# Patient Record
Sex: FEMALE | Race: WHITE | NOT HISPANIC OR LATINO | Employment: OTHER | URBAN - METROPOLITAN AREA
[De-identification: names, ages, dates, MRNs, and addresses within clinical notes are randomized per-mention and may not be internally consistent; named-entity substitution may affect disease eponyms.]

---

## 2017-09-17 ENCOUNTER — OFFICE VISIT (OUTPATIENT)
Dept: URGENT CARE | Facility: CLINIC | Age: 77
End: 2017-09-17
Payer: MEDICARE

## 2017-09-17 VITALS
TEMPERATURE: 98 F | SYSTOLIC BLOOD PRESSURE: 140 MMHG | WEIGHT: 160 LBS | HEIGHT: 64 IN | DIASTOLIC BLOOD PRESSURE: 69 MMHG | BODY MASS INDEX: 27.31 KG/M2 | OXYGEN SATURATION: 96 % | HEART RATE: 86 BPM

## 2017-09-17 DIAGNOSIS — S52.501A TRAUMATIC CLOSED DISPLACED FRACTURE OF DISTAL END OF RADIUS, RIGHT, INITIAL ENCOUNTER: Primary | ICD-10-CM

## 2017-09-17 PROCEDURE — 1159F MED LIST DOCD IN RCRD: CPT | Mod: S$GLB,,, | Performed by: EMERGENCY MEDICINE

## 2017-09-17 PROCEDURE — 99203 OFFICE O/P NEW LOW 30 MIN: CPT | Mod: S$GLB,,, | Performed by: EMERGENCY MEDICINE

## 2017-09-17 PROCEDURE — 3008F BODY MASS INDEX DOCD: CPT | Mod: S$GLB,,, | Performed by: EMERGENCY MEDICINE

## 2017-09-17 RX ORDER — TRAMADOL HYDROCHLORIDE 50 MG/1
50 TABLET ORAL EVERY 6 HOURS PRN
Qty: 17 TABLET | Refills: 0 | Status: SHIPPED | OUTPATIENT
Start: 2017-09-17

## 2017-09-17 RX ORDER — NAPROXEN 500 MG/1
TABLET ORAL
COMMUNITY
Start: 2017-08-29

## 2017-09-17 RX ORDER — NAPROXEN 25 MG/ML
SUSPENSION ORAL 2 TIMES DAILY
COMMUNITY
End: 2017-09-18 | Stop reason: CLARIF

## 2017-09-17 RX ORDER — HYDROCHLOROTHIAZIDE 12.5 MG/1
12.5 CAPSULE ORAL DAILY
COMMUNITY

## 2017-09-17 RX ORDER — LISINOPRIL 10 MG/1
TABLET ORAL DAILY
COMMUNITY
Start: 2017-07-18

## 2017-09-17 RX ORDER — LORAZEPAM 0.5 MG/1
TABLET ORAL
COMMUNITY
Start: 2017-08-29

## 2017-09-17 NOTE — PROGRESS NOTES
"Subjective:       Patient ID: Adela Mcdonough is a 77 y.o. female.    Vitals:  height is 5' 4" (1.626 m) and weight is 72.6 kg (160 lb). Her temperature is 98.2 °F (36.8 °C). Her blood pressure is 140/69 (abnormal) and her pulse is 86. Her oxygen saturation is 96%.     Chief Complaint: Wrist Pain    Wrist Pain    The pain is present in the right wrist. This is a new problem. The current episode started today. The problem occurs constantly. The problem has been gradually worsening. The quality of the pain is described as aching. The pain is at a severity of 9/10. The pain is severe. Associated symptoms include joint swelling, a limited range of motion and stiffness. Pertinent negatives include no fever. The symptoms are aggravated by activity. She has tried nothing for the symptoms. The treatment provided no relief.     Review of Systems   Constitution: Negative for chills and fever.   HENT: Negative for sore throat.    Eyes: Negative for blurred vision.   Cardiovascular: Negative for chest pain.   Respiratory: Negative for shortness of breath.    Skin: Negative for rash.   Musculoskeletal: Positive for falls, joint pain, joint swelling and stiffness. Negative for back pain.        Rt. Wrist. Patient fell on rt. wrist   Gastrointestinal: Negative for abdominal pain, diarrhea, nausea and vomiting.   Neurological: Negative for headaches.   Psychiatric/Behavioral: The patient is not nervous/anxious.        Objective:      Physical Exam   Constitutional: She is oriented to person, place, and time. She appears well-developed and well-nourished. She is cooperative.  Non-toxic appearance. She does not appear ill. No distress.   HENT:   Head: Normocephalic and atraumatic. Head is without abrasion, without contusion and without laceration.   Right Ear: Hearing, tympanic membrane, external ear and ear canal normal. No hemotympanum.   Left Ear: Hearing, tympanic membrane, external ear and ear canal normal. No " hemotympanum.   Nose: Nose normal. No mucosal edema, rhinorrhea or nasal deformity. No epistaxis. Right sinus exhibits no maxillary sinus tenderness and no frontal sinus tenderness. Left sinus exhibits no maxillary sinus tenderness and no frontal sinus tenderness.   Mouth/Throat: Uvula is midline, oropharynx is clear and moist and mucous membranes are normal. No trismus in the jaw. Normal dentition. No uvula swelling. No posterior oropharyngeal erythema.   Eyes: Conjunctivae, EOM and lids are normal. Pupils are equal, round, and reactive to light. Right eye exhibits no discharge. Left eye exhibits no discharge. No scleral icterus.   Sclera clear bilat   Neck: Trachea normal, normal range of motion, full passive range of motion without pain and phonation normal. Neck supple. No spinous process tenderness and no muscular tenderness present. No neck rigidity. No tracheal deviation present.   Cardiovascular: Normal rate, regular rhythm, normal heart sounds, intact distal pulses and normal pulses.    Pulmonary/Chest: Effort normal and breath sounds normal. No respiratory distress.   Abdominal: Soft. Normal appearance and bowel sounds are normal. She exhibits no distension, no pulsatile midline mass and no mass. There is no tenderness.   Musculoskeletal: She exhibits no edema or deformity.        Arms:  Neurological: She is alert and oriented to person, place, and time. She has normal strength. No cranial nerve deficit or sensory deficit. She exhibits normal muscle tone. She displays no seizure activity. Coordination normal. GCS eye subscore is 4. GCS verbal subscore is 5. GCS motor subscore is 6.   Skin: Skin is warm, dry and intact. Capillary refill takes less than 2 seconds. No abrasion, no bruising, no burn, no ecchymosis and no laceration noted. She is not diaphoretic. No pallor.   Psychiatric: She has a normal mood and affect. Her speech is normal and behavior is normal. Judgment and thought content normal.  Cognition and memory are normal.   Nursing note and vitals reviewed.    PROCEDURE NOTE:  VERBAL CONSENT OBTAINED  SUGAR TONG SPLINT APPLIED TO THE RUE BY RT AND SECURED IN PLACE WITH ELASTIC BANDAGES.  PT TOLERATED WELL WITH NO COMPLICATIONS.  Assessment:       1. Traumatic closed displaced fracture of distal end of radius, right, initial encounter        Plan:         Traumatic closed displaced fracture of distal end of radius, right, initial encounter  -     X-Ray Wrist Complete Right; Future; Expected date: 09/17/2017  -     tramadol (ULTRAM) 50 mg tablet; Take 1 tablet (50 mg total) by mouth every 6 (six) hours as needed for Pain.  Dispense: 17 tablet; Refill: 0  -     Ambulatory referral to Orthopedics

## 2017-09-17 NOTE — PATIENT INSTRUCTIONS
Please drink plenty of fluids.  Please get plenty of rest.  Please return here or go to the Emergency Department for any concerns or worsening of condition.  If you were prescribed a narcotic medication, do not drive or operate heavy equipment or machinery while taking these medications.  If you were not prescribed an anti-inflammatory medication, and if you do not have any history of stomach/intestinal ulcers, or kidney disease, or are not taking a blood thinner such as Coumadin, Plavix, Pradaxa, Eloquis, or Xaralta for example, it is OK to take over the counter Ibuprofen or Advil or Motrin or Aleve as directed.  Do not take these medications on an empty stomach.  Rest, ice, compression and elevation to the affected joint or limb as needed.  Please follow up with your primary care doctor or specialist as needed.    If you  smoke, please stop smoking.    PLEASE CALL Dr. BLANK OFFICE IN THE MORNING FOR AN APPOINTMENT      Understanding a Distal Radius Fracture      A fracture is a broken bone. A fracture in the distal radius is a break in the lower end of the radius. This is the larger bone in the forearm. Because the break occurs near the wrist, it is often called a wrist fracture.    The bone may be cracked, or it may be broken into 2 or more pieces. The pieces of bone may be lined up or they may have moved out of place. Sometimes, the bone may break through the skin. Nearby nerves, tissues, and joints also may be damaged. Depending on the severity of the fracture, healing may take several months or longer.  What causes a distal radius fracture?  This type of fracture is most often caused from a fall on an outstretched hand. It can also be caused from a blow, accident, or sports injury.  Symptoms of a distal radius fracture  Symptoms can include pain, swelling, and bruising. If the bone breaks through the skin, external bleeding can also occur. The wrist may look crooked, deformed, or bent. It may be hard to  move or use the arm, wrist, and hand for normal tasks and activities.  Treating a distal radius fracture  Treatment depends on how serious the fracture is. If needed, the bone is put back into place. This may be done with or without surgery. If surgery is needed, the surgeon may use devices such as pins, plates, or screws to hold the bone together. You may need to wear a splint or cast for a month or longer to protect the bone and keep it in place during healing. Other treatments may be also used to help reduce symptoms or regain function. These include:  · Cold packs. Putting an ice pack on the injured area may help reduce swelling and pain.  · Raising the arm and wrist. Keeping the arm and wrist raised above heart level may help reduce swelling.  · Pain medicines. Taking prescription or over-the-counter pain medicines may help reduce pain and swelling.  · Exercises. Doing certain exercises at home or with a physical therapist can help restore strength, flexibility, and range of motion in your arm, wrist, and hand. In general, exercises are not started until after the splint or cast is removed.  Possible complications of a distal radius fracture  These can include:  · Poor healing of the bone  · Weakness, stiffness, or loss of range of motion in the arm, wrist, or hand  · Osteoarthritis in the wrist joint  When to call your healthcare provider  Call your healthcare provider right away if you have any of these:  · Fever of 100.4°F (38°C) or higher, or as directed  · Symptoms that dont get better with treatment, or get worse  · Numbness, coldness, or swelling in your arm, hand, or fingers  · Fingernails that turn blue or gray in color  · A splint or cast that is damaged or feels too tight or loose  · New symptoms   Date Last Reviewed: 3/10/2016  © 7711-8126 [employer]. [address] [place] [address], [place] [address]. All rights reserved. This information is not intended as a substitute for professional  medical care. Always follow your healthcare professional's instructions.

## 2017-09-19 ENCOUNTER — SURGERY (OUTPATIENT)
Age: 77
End: 2017-09-19

## 2017-09-19 ENCOUNTER — HOSPITAL ENCOUNTER (OUTPATIENT)
Facility: OTHER | Age: 77
Discharge: HOME | End: 2017-09-19
Attending: ORTHOPAEDIC SURGERY | Admitting: ORTHOPAEDIC SURGERY
Payer: MEDICARE

## 2017-09-19 ENCOUNTER — ANESTHESIA EVENT (OUTPATIENT)
Dept: SURGERY | Facility: OTHER | Age: 77
End: 2017-09-19
Payer: MEDICARE

## 2017-09-19 ENCOUNTER — ANESTHESIA (OUTPATIENT)
Dept: SURGERY | Facility: OTHER | Age: 77
End: 2017-09-19
Payer: MEDICARE

## 2017-09-19 VITALS
SYSTOLIC BLOOD PRESSURE: 168 MMHG | HEIGHT: 64 IN | OXYGEN SATURATION: 94 % | BODY MASS INDEX: 27.31 KG/M2 | RESPIRATION RATE: 16 BRPM | HEART RATE: 74 BPM | TEMPERATURE: 98 F | WEIGHT: 160 LBS | DIASTOLIC BLOOD PRESSURE: 84 MMHG

## 2017-09-19 DIAGNOSIS — S52.501A CLOSED FRACTURE OF DISTAL END OF RIGHT RADIUS, UNSPECIFIED FRACTURE MORPHOLOGY, INITIAL ENCOUNTER: Primary | ICD-10-CM

## 2017-09-19 DIAGNOSIS — S52.501A CLOSED FRACTURE OF RIGHT DISTAL RADIUS: ICD-10-CM

## 2017-09-19 PROCEDURE — 36000708 HC OR TIME LEV III 1ST 15 MIN: Performed by: ORTHOPAEDIC SURGERY

## 2017-09-19 PROCEDURE — 25000003 PHARM REV CODE 250: Performed by: ORTHOPAEDIC SURGERY

## 2017-09-19 PROCEDURE — 71000039 HC RECOVERY, EACH ADD'L HOUR: Performed by: ORTHOPAEDIC SURGERY

## 2017-09-19 PROCEDURE — 71000033 HC RECOVERY, INTIAL HOUR: Performed by: ORTHOPAEDIC SURGERY

## 2017-09-19 PROCEDURE — 94640 AIRWAY INHALATION TREATMENT: CPT

## 2017-09-19 PROCEDURE — 63600175 PHARM REV CODE 636 W HCPCS: Performed by: NURSE ANESTHETIST, CERTIFIED REGISTERED

## 2017-09-19 PROCEDURE — 37000008 HC ANESTHESIA 1ST 15 MINUTES: Performed by: ORTHOPAEDIC SURGERY

## 2017-09-19 PROCEDURE — 71000015 HC POSTOP RECOV 1ST HR: Performed by: ORTHOPAEDIC SURGERY

## 2017-09-19 PROCEDURE — 63600175 PHARM REV CODE 636 W HCPCS: Performed by: ANESTHESIOLOGY

## 2017-09-19 PROCEDURE — S0077 INJECTION, CLINDAMYCIN PHOSP: HCPCS | Performed by: ORTHOPAEDIC SURGERY

## 2017-09-19 PROCEDURE — C1713 ANCHOR/SCREW BN/BN,TIS/BN: HCPCS | Performed by: ORTHOPAEDIC SURGERY

## 2017-09-19 PROCEDURE — 25000003 PHARM REV CODE 250: Performed by: NURSE ANESTHETIST, CERTIFIED REGISTERED

## 2017-09-19 PROCEDURE — 25000003 PHARM REV CODE 250: Performed by: ANESTHESIOLOGY

## 2017-09-19 PROCEDURE — 36000709 HC OR TIME LEV III EA ADD 15 MIN: Performed by: ORTHOPAEDIC SURGERY

## 2017-09-19 PROCEDURE — 25000242 PHARM REV CODE 250 ALT 637 W/ HCPCS: Performed by: ANESTHESIOLOGY

## 2017-09-19 PROCEDURE — 27201423 OPTIME MED/SURG SUP & DEVICES STERILE SUPPLY: Performed by: ORTHOPAEDIC SURGERY

## 2017-09-19 PROCEDURE — 37000009 HC ANESTHESIA EA ADD 15 MINS: Performed by: ORTHOPAEDIC SURGERY

## 2017-09-19 DEVICE — PLATE DISTAL VOLAR RT SHORT: Type: IMPLANTABLE DEVICE | Site: WRIST | Status: FUNCTIONAL

## 2017-09-19 DEVICE — PEG THREADED 2.5MM 22MM LONG: Type: IMPLANTABLE DEVICE | Site: WRIST | Status: FUNCTIONAL

## 2017-09-19 DEVICE — SCREW CORTICAL 3.5 X 12: Type: IMPLANTABLE DEVICE | Site: ARM | Status: FUNCTIONAL

## 2017-09-19 DEVICE — PEG FULLY THRD 2.5X18: Type: IMPLANTABLE DEVICE | Site: WRIST | Status: FUNCTIONAL

## 2017-09-19 DEVICE — PEG SUPPORT SUBCHONDRAL 2.0X24: Type: IMPLANTABLE DEVICE | Site: WRIST | Status: FUNCTIONAL

## 2017-09-19 DEVICE — SCREW CORTICAL 3.5 X 10: Type: IMPLANTABLE DEVICE | Site: WRIST | Status: FUNCTIONAL

## 2017-09-19 DEVICE — PEG THREADED 2.5 X 20: Type: IMPLANTABLE DEVICE | Site: ARM | Status: FUNCTIONAL

## 2017-09-19 DEVICE — PEG SUPPORT SUBCHONDRAL 2.0X22: Type: IMPLANTABLE DEVICE | Site: WRIST | Status: FUNCTIONAL

## 2017-09-19 RX ORDER — PROPOFOL 10 MG/ML
VIAL (ML) INTRAVENOUS
Status: DISCONTINUED | OUTPATIENT
Start: 2017-09-19 | End: 2017-09-19

## 2017-09-19 RX ORDER — SODIUM CHLORIDE 0.9 % (FLUSH) 0.9 %
3 SYRINGE (ML) INJECTION
Status: DISCONTINUED | OUTPATIENT
Start: 2017-09-19 | End: 2017-09-19 | Stop reason: HOSPADM

## 2017-09-19 RX ORDER — FENTANYL CITRATE 50 UG/ML
INJECTION, SOLUTION INTRAMUSCULAR; INTRAVENOUS
Status: DISCONTINUED | OUTPATIENT
Start: 2017-09-19 | End: 2017-09-19

## 2017-09-19 RX ORDER — GLYCOPYRROLATE 0.2 MG/ML
INJECTION INTRAMUSCULAR; INTRAVENOUS
Status: DISCONTINUED | OUTPATIENT
Start: 2017-09-19 | End: 2017-09-19

## 2017-09-19 RX ORDER — HYDROCODONE BITARTRATE AND ACETAMINOPHEN 5; 325 MG/1; MG/1
1 TABLET ORAL EVERY 4 HOURS PRN
Qty: 20 TABLET | Refills: 0 | Status: SHIPPED | OUTPATIENT
Start: 2017-09-19

## 2017-09-19 RX ORDER — SODIUM CHLORIDE, SODIUM LACTATE, POTASSIUM CHLORIDE, CALCIUM CHLORIDE 600; 310; 30; 20 MG/100ML; MG/100ML; MG/100ML; MG/100ML
INJECTION, SOLUTION INTRAVENOUS CONTINUOUS PRN
Status: DISCONTINUED | OUTPATIENT
Start: 2017-09-19 | End: 2017-09-19

## 2017-09-19 RX ORDER — OXYCODONE HYDROCHLORIDE 5 MG/1
5 TABLET ORAL
Status: DISCONTINUED | OUTPATIENT
Start: 2017-09-19 | End: 2017-09-19 | Stop reason: HOSPADM

## 2017-09-19 RX ORDER — FENTANYL CITRATE 50 UG/ML
25 INJECTION, SOLUTION INTRAMUSCULAR; INTRAVENOUS EVERY 5 MIN PRN
Status: DISCONTINUED | OUTPATIENT
Start: 2017-09-19 | End: 2017-09-19 | Stop reason: HOSPADM

## 2017-09-19 RX ORDER — ALBUTEROL SULFATE 0.83 MG/ML
2.5 SOLUTION RESPIRATORY (INHALATION) EVERY 4 HOURS PRN
Status: DISCONTINUED | OUTPATIENT
Start: 2017-09-19 | End: 2017-09-19 | Stop reason: HOSPADM

## 2017-09-19 RX ORDER — ACETAMINOPHEN 10 MG/ML
INJECTION, SOLUTION INTRAVENOUS
Status: DISCONTINUED | OUTPATIENT
Start: 2017-09-19 | End: 2017-09-19

## 2017-09-19 RX ORDER — MEPERIDINE HYDROCHLORIDE 50 MG/ML
12.5 INJECTION INTRAMUSCULAR; INTRAVENOUS; SUBCUTANEOUS ONCE AS NEEDED
Status: DISCONTINUED | OUTPATIENT
Start: 2017-09-19 | End: 2017-09-19 | Stop reason: HOSPADM

## 2017-09-19 RX ORDER — ONDANSETRON 2 MG/ML
INJECTION INTRAMUSCULAR; INTRAVENOUS
Status: DISCONTINUED | OUTPATIENT
Start: 2017-09-19 | End: 2017-09-19

## 2017-09-19 RX ORDER — HYDROCODONE BITARTRATE AND ACETAMINOPHEN 5; 325 MG/1; MG/1
1 TABLET ORAL EVERY 4 HOURS PRN
Status: DISCONTINUED | OUTPATIENT
Start: 2017-09-19 | End: 2017-09-19 | Stop reason: HOSPADM

## 2017-09-19 RX ORDER — LIDOCAINE HCL/PF 100 MG/5ML
SYRINGE (ML) INTRAVENOUS
Status: DISCONTINUED | OUTPATIENT
Start: 2017-09-19 | End: 2017-09-19

## 2017-09-19 RX ORDER — OXYCODONE HYDROCHLORIDE 5 MG/1
5 TABLET ORAL
Status: DISCONTINUED | OUTPATIENT
Start: 2017-09-19 | End: 2017-09-19

## 2017-09-19 RX ORDER — PHENYLEPHRINE HYDROCHLORIDE 10 MG/ML
INJECTION INTRAVENOUS
Status: DISCONTINUED | OUTPATIENT
Start: 2017-09-19 | End: 2017-09-19

## 2017-09-19 RX ORDER — DEXAMETHASONE SODIUM PHOSPHATE 4 MG/ML
INJECTION, SOLUTION INTRA-ARTICULAR; INTRALESIONAL; INTRAMUSCULAR; INTRAVENOUS; SOFT TISSUE
Status: DISCONTINUED | OUTPATIENT
Start: 2017-09-19 | End: 2017-09-19

## 2017-09-19 RX ORDER — BUPIVACAINE HYDROCHLORIDE 2.5 MG/ML
INJECTION, SOLUTION EPIDURAL; INFILTRATION; INTRACAUDAL
Status: DISCONTINUED | OUTPATIENT
Start: 2017-09-19 | End: 2017-09-19 | Stop reason: HOSPADM

## 2017-09-19 RX ORDER — CLINDAMYCIN PHOSPHATE 900 MG/50ML
900 INJECTION, SOLUTION INTRAVENOUS
Status: COMPLETED | OUTPATIENT
Start: 2017-09-19 | End: 2017-09-19

## 2017-09-19 RX ORDER — HYDROMORPHONE HYDROCHLORIDE 2 MG/ML
0.4 INJECTION, SOLUTION INTRAMUSCULAR; INTRAVENOUS; SUBCUTANEOUS EVERY 5 MIN PRN
Status: DISCONTINUED | OUTPATIENT
Start: 2017-09-19 | End: 2017-09-19 | Stop reason: HOSPADM

## 2017-09-19 RX ADMIN — ACETAMINOPHEN 1000 MG: 10 INJECTION, SOLUTION INTRAVENOUS at 12:09

## 2017-09-19 RX ADMIN — ONDANSETRON 4 MG: 2 INJECTION INTRAMUSCULAR; INTRAVENOUS at 12:09

## 2017-09-19 RX ADMIN — FENTANYL CITRATE 25 MCG: 50 INJECTION, SOLUTION INTRAMUSCULAR; INTRAVENOUS at 12:09

## 2017-09-19 RX ADMIN — FENTANYL CITRATE 25 MCG: 50 INJECTION, SOLUTION INTRAMUSCULAR; INTRAVENOUS at 01:09

## 2017-09-19 RX ADMIN — CLINDAMYCIN PHOSPHATE 900 MG: 18 INJECTION, SOLUTION INTRAVENOUS at 12:09

## 2017-09-19 RX ADMIN — FENTANYL CITRATE 50 MCG: 50 INJECTION, SOLUTION INTRAMUSCULAR; INTRAVENOUS at 01:09

## 2017-09-19 RX ADMIN — OXYCODONE HYDROCHLORIDE 5 MG: 5 TABLET ORAL at 01:09

## 2017-09-19 RX ADMIN — PROPOFOL 200 MG: 10 INJECTION, EMULSION INTRAVENOUS at 12:09

## 2017-09-19 RX ADMIN — PHENYLEPHRINE HYDROCHLORIDE 100 MCG: 10 INJECTION INTRAVENOUS at 12:09

## 2017-09-19 RX ADMIN — LIDOCAINE HYDROCHLORIDE 100 MG: 20 INJECTION, SOLUTION INTRAVENOUS at 12:09

## 2017-09-19 RX ADMIN — BUPIVACAINE HYDROCHLORIDE 10 ML: 2.5 INJECTION, SOLUTION EPIDURAL; INFILTRATION; INTRACAUDAL; PERINEURAL at 12:09

## 2017-09-19 RX ADMIN — ALBUTEROL SULFATE 2.5 MG: 2.5 SOLUTION RESPIRATORY (INHALATION) at 10:09

## 2017-09-19 RX ADMIN — FENTANYL CITRATE 50 MCG: 50 INJECTION, SOLUTION INTRAMUSCULAR; INTRAVENOUS at 12:09

## 2017-09-19 RX ADMIN — SODIUM CHLORIDE, SODIUM LACTATE, POTASSIUM CHLORIDE, AND CALCIUM CHLORIDE: 600; 310; 30; 20 INJECTION, SOLUTION INTRAVENOUS at 11:09

## 2017-09-19 RX ADMIN — FENTANYL CITRATE 25 MCG: 50 INJECTION INTRAMUSCULAR; INTRAVENOUS at 01:09

## 2017-09-19 RX ADMIN — GLYCOPYRROLATE 0.2 MG: 0.2 INJECTION, SOLUTION INTRAMUSCULAR; INTRAVENOUS at 12:09

## 2017-09-19 RX ADMIN — DEXAMETHASONE SODIUM PHOSPHATE 8 MG: 4 INJECTION, SOLUTION INTRAMUSCULAR; INTRAVENOUS at 12:09

## 2017-09-19 NOTE — OP NOTE
DATE OF PROCEDURE: 9/19/17  PREOPERATIVE DIAGNOSIS: Distal radius fracture, right  wrist.   POSTOPERATIVE DIAGNOSIS: Distal radius fracture,  wrist.   PROCEDURE PERFORMED: Open reduction and internal fixation of right distal   radius fracture.   INDICATIONS: This patient is a 76 yo woman who sustained an injury the right   wrist. Radiographs have demonstrated a fracture of the distal radius with some   displacement. After the pros, cons, risks and benefits of operative   intervention were reviewed, the patient has elected to undergo the above procedure.     PROCEDURE IN DETAIL: The patient was brought to the Operating Suite, placed   supine on the operating table. General endotracheal anesthesia was administered   per the anesthesiologist without difficulty. The right upper extremity was   thoroughly prepped with alcohol and Betadine and draped in the usual sterile   fashion. After Esmarch exsanguination, the tourniquet was elevated to 250 mmHg.   A longitudinal incision was then made over the volar aspect of the right wrist   and careful dissection was carried down through the subdermal tissue using   bipolar cautery for hemostasis. The flexor carpi radialis tendon was identified   and it was retracted toward the ulnar aspect of the wrist. The pronator   quadratus was taken down from the distal radius and the fracture site was   identified, cleaned of soft tissue and periosteum and was then reduced   anatomically. Definitive fixation was then carried out using a DVR distal   radius locking plate, which was placed in appropriate position and secured to   the shaft with a bicortical screw of appropriate length. The fracture was then   reduced. Using the image intensifier, it was confirmed to be in appropriate   position and the distal peg holes were filled with a combination of smooth and   partially threaded locking pegs of appropriate length. The remainder of the   proximal holes was then filled with bicortical  screws and the tourniquet was   deflated. Hemostasis was confirmed. The image intensifier confirmed again in   the AP and lateral planes appropriate position of the fracture and hardware.   The wound was then closed in layers with Vicryl and nylon suture. A sterile   soft dressing and volar splint was then applied. The patient was awakened in   the Operating Suite and taken to the recovery area in stable condition. She   tolerated the procedure very well. Lap, instrument and needle counts were   correct at the end of the procedure x2.   BLOOD LOSS: Minimal.   COMPLICATIONS: None.     Claude S. Williams, MD

## 2017-09-19 NOTE — OR NURSING
Called Dr. Reyes and requested resp. tx for productive cough with exp. Wheeze.Albuterol tx ordered

## 2017-09-19 NOTE — INTERVAL H&P NOTE
The patient has been examined and the H&P has been reviewed:    I concur with the findings and no changes have occurred since H&P was written.    Anesthesia/Surgery risks, benefits and alternative options discussed and understood by patient/family.          Active Hospital Problems    Diagnosis  POA    Closed fracture of right distal radius [S52.308K]  Yes      Resolved Hospital Problems    Diagnosis Date Resolved POA   No resolved problems to display.

## 2017-09-19 NOTE — TRANSFER OF CARE
"Anesthesia Transfer of Care Note    Patient: Adela Mcdonough    Procedure(s) Performed: Procedure(s) (LRB):  OPEN REDUCTION INTERNAL FIXATION-WRIST (Right)    Patient location: PACU    Anesthesia Type: general    Transport from OR: Transported from OR on 2-3 L/min O2 by NC with adequate spontaneous ventilation    Post pain: adequate analgesia    Post assessment: no apparent anesthetic complications and tolerated procedure well    Post vital signs: stable    Level of consciousness: awake, alert and oriented    Nausea/Vomiting: no nausea/vomiting    Complications: none    Transfer of care protocol was followed      Last vitals:   Visit Vitals  BP (!) 177/74 (BP Location: Left arm, Patient Position: Lying)   Pulse 71   Temp 36.4 °C (97.6 °F) (Oral)   Resp 16   Ht 5' 4" (1.626 m)   Wt 72.6 kg (160 lb)   SpO2 97%   Breastfeeding? No   BMI 27.46 kg/m²     "

## 2017-09-19 NOTE — BRIEF OP NOTE
[facility]  Brief Operative Note     SUMMARY     Surgery Date: 9/19/2017     Surgeon(s) and Role:     * Claude S. Williams IV, MD - Primary    Assisting Surgeon: None    Pre-op Diagnosis:  Closed fracture of distal end of right radius, unspecified fracture morphology, initial encounter [S52.501A]    Post-op Diagnosis:  Post-Op Diagnosis Codes:     * Closed fracture of distal end of right radius, unspecified fracture morphology, initial encounter [S52.501A]    Procedure(s) (LRB):  OPEN REDUCTION INTERNAL FIXATION-WRIST (Right)    Anesthesia: General    Description of the findings of the procedure: as above    Findings/Key Components: as above    Estimated Blood Loss: * No values recorded between 9/19/2017 12:34 PM and 9/19/2017  1:04 PM *         Specimens:   Specimen (12h ago through future)    None          Discharge Note    SUMMARY     Admit Date: 9/19/2017    Discharge Date and Time:  09/19/2017 1:04 PM    Hospital Course (synopsis of major diagnoses, care, treatment, and services provided during the course of the hospital stay): uneventful     Final Diagnosis: Post-Op Diagnosis Codes:     * Closed fracture of distal end of right radius, unspecified fracture morphology, initial encounter [S52.501A]    Disposition: Home or Self Care    Follow Up/Patient Instructions:     Medications:  Reconciled Home Medications:   Current Discharge Medication List      START taking these medications    Details   hydrocodone-acetaminophen 5-325mg (NORCO) 5-325 mg per tablet Take 1 tablet by mouth every 4 (four) hours as needed for Pain.  Qty: 20 tablet, Refills: 0         CONTINUE these medications which have NOT CHANGED    Details   calcium citrate-vitamin D2 1,500-200 mg-unit Tab       hydrochlorothiazide (MICROZIDE) 12.5 mg capsule Take 12.5 mg by mouth once daily.      lisinopril 10 MG tablet once daily.       lorazepam (ATIVAN) 0.5 MG tablet       multivitamin-Ca-iron-minerals 27-0.4 mg Tab       naproxen  (NAPROSYN) 500 MG tablet       tramadol (ULTRAM) 50 mg tablet Take 1 tablet (50 mg total) by mouth every 6 (six) hours as needed for Pain.  Qty: 17 tablet, Refills: 0    Associated Diagnoses: Traumatic closed displaced fracture of distal end of radius, right, initial encounter             Discharge Procedure Orders  Diet general     Call MD for:  temperature >100.4     Call MD for:  persistent nausea and vomiting     Call MD for:  severe uncontrolled pain     Call MD for:  difficulty breathing, headache or visual disturbances     Call MD for:  redness, tenderness, or signs of infection (pain, swelling, redness, odor or green/yellow discharge around incision site)     Call MD for:  hives     Call MD for:  persistent dizziness or light-headedness     Call MD for:  extreme fatigue     Leave dressing on - Keep it clean, dry, and intact until clinic visit     Keep surgical extremity elevated     Lifting restrictions     No driving, operating heavy equipment or signing legal documents while taking pain medication       Follow-up Information     Claude S. Williams Iv, MD.    Specialty:  Orthopedic Surgery  Why:  For wound re-check  Contact information:  [address]  [facility] [address]  [phone]

## 2017-09-19 NOTE — ANESTHESIA POSTPROCEDURE EVALUATION
"Anesthesia Post Evaluation    Patient: Adela Mcdonough    Procedure(s) Performed: Procedure(s) (LRB):  OPEN REDUCTION INTERNAL FIXATION-WRIST (Right)    Final Anesthesia Type: general  Patient location during evaluation: PACU  Patient participation: Yes- Able to Participate  Level of consciousness: awake and alert  Post-procedure vital signs: reviewed and stable  Pain management: adequate  Airway patency: patent  PONV status at discharge: No PONV  Anesthetic complications: no      Cardiovascular status: blood pressure returned to baseline  Respiratory status: unassisted, spontaneous ventilation and room air  Hydration status: euvolemic  Follow-up not needed.        Visit Vitals  BP (!) 160/67   Pulse 70   Temp 36.7 °C (98.1 °F) (Oral)   Resp 16   Ht 5' 4" (1.626 m)   Wt 72.6 kg (160 lb)   SpO2 (!) 94%   Breastfeeding? No   BMI 27.46 kg/m²       Pain/Naomi Score: Pain Assessment Performed: Yes (9/19/2017  2:10 PM)  Presence of Pain: complains of pain/discomfort (9/19/2017  2:10 PM)  Pain Rating Prior to Med Admin: 6 (9/19/2017  1:50 PM)  Pain Rating Post Med Admin: 4 (9/19/2017  2:00 PM)  Naomi Score: 10 (9/19/2017  2:10 PM)  Modified Naomi Score: 17 (9/19/2017  1:14 PM)      "

## 2017-09-19 NOTE — ANESTHESIA PREPROCEDURE EVALUATION
09/19/2017  Adela Mcdonough is a 77 y.o., female.    Anesthesia Evaluation    I have reviewed the Patient Summary Reports.    I have reviewed the Nursing Notes.   I have reviewed the Medications.     Review of Systems  Anesthesia Hx:  No problems with previous Anesthesia  Denies Family Hx of Anesthesia complications.   Denies Personal Hx of Anesthesia complications.   Social:  Smoker 1 ppd   Hematology/Oncology:  Hematology Normal   Oncology Normal     EENT/Dental:EENT/Dental Normal   Cardiovascular:   Exercise tolerance: good Hypertension, well controlled    Pulmonary:  Pulmonary Normal    Renal/:  Renal/ Normal     Musculoskeletal:  Musculoskeletal Normal    Neurological:  Neurology Normal    Endocrine:  Endocrine Normal    Dermatological:  Skin Normal    Psych:  Psychiatric Normal           Physical Exam  General:  Well nourished    Airway/Jaw/Neck:  Airway Findings: Mouth Opening: Normal Tongue: Normal  General Airway Assessment: Adult  Mallampati: I  TM Distance: Normal, at least 6 cm  Jaw/Neck Findings:  Neck ROM: Normal ROM      Dental:  Dental Findings: In tact             Anesthesia Plan  Type of Anesthesia, risks & benefits discussed:  Anesthesia Type:  general  Patient's Preference:   Intra-op Monitoring Plan:   Intra-op Monitoring Plan Comments:   Post Op Pain Control Plan:   Post Op Pain Control Plan Comments:   Induction:   IV  Beta Blocker:         Informed Consent: Patient understands risks and agrees with Anesthesia plan.  Questions answered. Anesthesia consent signed with patient.  ASA Score: 2     Day of Surgery Review of History & Physical:    H&P update referred to the surgeon.         Ready For Surgery From Anesthesia Perspective.

## 2017-09-19 NOTE — PLAN OF CARE
Adela Elsie Mcdonough has met all discharge criteria from Phase II. Vital Signs are stable, ambulating  without difficulty. Discharge instructions given, patient verbalized understanding. Discharged from facility via wheelchair in stable condition.

## 2017-09-19 NOTE — DISCHARGE INSTRUCTIONS

## 2018-09-03 ENCOUNTER — OFFICE VISIT (OUTPATIENT)
Dept: URGENT CARE | Facility: CLINIC | Age: 78
End: 2018-09-03
Payer: MEDICARE

## 2018-09-03 VITALS
TEMPERATURE: 98 F | BODY MASS INDEX: 28.68 KG/M2 | WEIGHT: 168 LBS | HEIGHT: 64 IN | OXYGEN SATURATION: 96 % | HEART RATE: 79 BPM | DIASTOLIC BLOOD PRESSURE: 79 MMHG | SYSTOLIC BLOOD PRESSURE: 180 MMHG

## 2018-09-03 DIAGNOSIS — J20.9 ACUTE PURULENT BRONCHITIS: ICD-10-CM

## 2018-09-03 DIAGNOSIS — J98.01 ACUTE BRONCHOSPASM: Primary | ICD-10-CM

## 2018-09-03 DIAGNOSIS — R05.9 COUGH: ICD-10-CM

## 2018-09-03 PROCEDURE — 99214 OFFICE O/P EST MOD 30 MIN: CPT | Mod: 25,S$GLB,, | Performed by: NURSE PRACTITIONER

## 2018-09-03 PROCEDURE — 94640 AIRWAY INHALATION TREATMENT: CPT | Mod: 59,S$GLB,, | Performed by: NURSE PRACTITIONER

## 2018-09-03 PROCEDURE — 96372 THER/PROPH/DIAG INJ SC/IM: CPT | Mod: S$GLB,,, | Performed by: NURSE PRACTITIONER

## 2018-09-03 RX ORDER — IPRATROPIUM BROMIDE 0.5 MG/2.5ML
0.5 SOLUTION RESPIRATORY (INHALATION)
Status: COMPLETED | OUTPATIENT
Start: 2018-09-03 | End: 2018-09-03

## 2018-09-03 RX ORDER — POTASSIUM CHLORIDE 750 MG/1
10 CAPSULE, EXTENDED RELEASE ORAL ONCE
COMMUNITY

## 2018-09-03 RX ORDER — BETAMETHASONE SODIUM PHOSPHATE AND BETAMETHASONE ACETATE 3; 3 MG/ML; MG/ML
6 INJECTION, SUSPENSION INTRA-ARTICULAR; INTRALESIONAL; INTRAMUSCULAR; SOFT TISSUE
Status: COMPLETED | OUTPATIENT
Start: 2018-09-03 | End: 2018-09-03

## 2018-09-03 RX ORDER — ALBUTEROL SULFATE 90 UG/1
2 AEROSOL, METERED RESPIRATORY (INHALATION) EVERY 6 HOURS PRN
Qty: 1 INHALER | Refills: 0 | Status: SHIPPED | OUTPATIENT
Start: 2018-09-03

## 2018-09-03 RX ORDER — PREDNISONE 20 MG/1
20 TABLET ORAL DAILY
Qty: 5 TABLET | Refills: 0 | Status: SHIPPED | OUTPATIENT
Start: 2018-09-03 | End: 2018-09-08

## 2018-09-03 RX ORDER — PREDNISONE 20 MG/1
20 TABLET ORAL DAILY
Qty: 5 TABLET | Refills: 0 | Status: SHIPPED | OUTPATIENT
Start: 2018-09-03 | End: 2018-09-03

## 2018-09-03 RX ORDER — DOXYCYCLINE 100 MG/1
100 CAPSULE ORAL 2 TIMES DAILY
Qty: 20 CAPSULE | Refills: 0 | Status: SHIPPED | OUTPATIENT
Start: 2018-09-03 | End: 2018-09-03

## 2018-09-03 RX ORDER — DOXYCYCLINE 100 MG/1
100 CAPSULE ORAL 2 TIMES DAILY
Qty: 20 CAPSULE | Refills: 0 | Status: SHIPPED | OUTPATIENT
Start: 2018-09-03 | End: 2018-09-13

## 2018-09-03 RX ORDER — ALBUTEROL SULFATE 0.83 MG/ML
2.5 SOLUTION RESPIRATORY (INHALATION)
Status: COMPLETED | OUTPATIENT
Start: 2018-09-03 | End: 2018-09-03

## 2018-09-03 RX ADMIN — IPRATROPIUM BROMIDE 0.5 MG: 0.5 SOLUTION RESPIRATORY (INHALATION) at 10:09

## 2018-09-03 RX ADMIN — BETAMETHASONE SODIUM PHOSPHATE AND BETAMETHASONE ACETATE 6 MG: 3; 3 INJECTION, SUSPENSION INTRA-ARTICULAR; INTRALESIONAL; INTRAMUSCULAR; SOFT TISSUE at 10:09

## 2018-09-03 RX ADMIN — ALBUTEROL SULFATE 2.5 MG: 0.83 SOLUTION RESPIRATORY (INHALATION) at 10:09

## 2018-09-03 NOTE — PROGRESS NOTES
"Subjective:       Patient ID: Adela Mcdonough is a 77 y.o. female.    Vitals:  height is 5' 4" (1.626 m) and weight is 76.2 kg (168 lb). Her oral temperature is 97.7 °F (36.5 °C). Her blood pressure is 180/79 (abnormal) and her pulse is 79. Her oxygen saturation is 96%.     Chief Complaint: Sinus Problem    Patient reports pain across her upper back beginning last night and decreasing today. Patient reports no SOB  but does report some sinus congestion.   She has been coughing for about a week, reports wheezing. She has not taken her albuterol because it is . She is a everyday smoker. Denies fever but positive for chills.         Sinus Problem   This is a new problem. The current episode started in the past 7 days (18). The problem has been gradually worsening since onset. There has been no fever. Her pain is at a severity of 0/10 (Back pain reported last night- rated a 7). Associated symptoms include congestion, coughing and sinus pressure. Pertinent negatives include no chills, ear pain, headaches, hoarse voice, shortness of breath or sore throat. Treatments tried: Tramadol. The treatment provided mild relief.     Review of Systems   Constitution: Negative for chills, fever and malaise/fatigue.   HENT: Positive for congestion and sinus pressure. Negative for ear pain, hoarse voice and sore throat.    Eyes: Negative for discharge and redness.   Cardiovascular: Positive for chest pain. Negative for dyspnea on exertion and leg swelling.        On left side and side of chest   Respiratory: Positive for cough and sputum production. Negative for shortness of breath and wheezing.         Sputum described as yellow   Musculoskeletal: Positive for back pain. Negative for myalgias.   Gastrointestinal: Negative for abdominal pain, nausea and vomiting.   Neurological: Negative for headaches.       Objective:      Physical Exam   Constitutional: She is oriented to person, place, and time. She " appears well-developed and well-nourished. She is cooperative.  Non-toxic appearance. She does not have a sickly appearance. She does not appear ill. No distress.   HENT:   Head: Normocephalic and atraumatic.   Right Ear: Hearing, tympanic membrane, external ear and ear canal normal.   Left Ear: Hearing, tympanic membrane, external ear and ear canal normal.   Nose: Mucosal edema and rhinorrhea present. No nasal deformity. No epistaxis. Right sinus exhibits no maxillary sinus tenderness and no frontal sinus tenderness. Left sinus exhibits no maxillary sinus tenderness and no frontal sinus tenderness.   Mouth/Throat: Uvula is midline and mucous membranes are normal. No trismus in the jaw. Normal dentition. No uvula swelling. Posterior oropharyngeal edema and posterior oropharyngeal erythema present.   Eyes: Conjunctivae and lids are normal. No scleral icterus.   Sclera clear bilat   Neck: Trachea normal, full passive range of motion without pain and phonation normal. Neck supple.   Cardiovascular: Normal rate, regular rhythm, normal heart sounds, intact distal pulses and normal pulses.   Pulses:       Radial pulses are 2+ on the right side, and 2+ on the left side.   Pulmonary/Chest: Effort normal. No respiratory distress. She has wheezes (decreased air movment upper lobes) in the right upper field and the left upper field. She has rhonchi in the right upper field and the left upper field.   Abdominal: Soft. Normal appearance and bowel sounds are normal. She exhibits no distension. There is no tenderness.   Musculoskeletal: Normal range of motion. She exhibits no edema or deformity.   Neurological: She is alert and oriented to person, place, and time. She exhibits normal muscle tone. Coordination normal.   Skin: Skin is warm, dry and intact. She is not diaphoretic. No pallor.   Psychiatric: She has a normal mood and affect. Her speech is normal and behavior is normal. Judgment and thought content normal. Cognition and  memory are normal.   Nursing note and vitals reviewed.    X-ray Chest Pa And Lateral    Result Date: 9/3/2018  EXAMINATION: XR CHEST PA AND LATERAL CLINICAL HISTORY: cough; Cough TECHNIQUE: PA and lateral views of the chest were performed. COMPARISON: None FINDINGS: The lungs are clear, with normal appearance of pulmonary vasculature and no pleural effusion or pneumothorax. The cardiac silhouette is normal in size. The hilar and mediastinal contours are unremarkable. There are mild degenerative changes of the spine with slight loss of height of 1 of the mid and 1 of the lower thoracic vertebral bodies.  There are atherosclerotic calcifications of the aorta.  There are calcified breast implants bilaterally.     No acute cardiopulmonary process is identified. Electronically signed by: Gladis Gonzales MD Date:    09/03/2018 Time:    10:56  Assessment:       1. Cough    2. Acute bronchospasm        Plan:     POST DUONEB IN CLINIC: AIR MOVEMENT IMPROVED. NO WHEEZING NOTED.    Cough  -     X-Ray Chest PA And Lateral; Future; Expected date: 09/03/2018    Acute bronchospasm  -     betamethasone acetate-betamethasone sodium phosphate injection 6 mg; Inject 1 mL (6 mg total) into the muscle one time.  -     albuterol nebulizer solution 2.5 mg; Take 3 mLs (2.5 mg total) by nebulization one time.  -     ipratropium 0.02 % nebulizer solution 0.5 mg; Take 2.5 mLs (0.5 mg total) by nebulization one time.      Patient Instructions     Follow up with your doctor in a few days.  Return to the urgent care or go to the ER if symptoms get worse.    TAKE RX ANTIBIOTIC FOR FULL DURATION-DOXYCYCLINE.  TAKE STEROIDS, STARTING TOMORROW, FOR 5 DAYS.  USE ALBUTEROL RESCUE INHALER EVERY 6 HOURS FOR WHEEZING WHEN NEEDED.    DRINK FLUIDS, GET REST.    TAKE MUCINEX DM FOR CHEST CONGESTION.      What Is Acute Bronchitis?  Acute bronchitis is when the airways in your lungs (bronchial tubes) become red and swollen (inflamed). It is usually caused by  a viral infection. But it can also occur because of a bacteria or allergen. Symptoms include a cough that produces yellow or greenish mucus and can last for days or sometimes weeks.  Inside healthy lungs    Air travels in and out of the lungs through the airways. The linings of these airways produce sticky mucus. This mucus traps particles that enter the lungs. Tiny structures called cilia then sweep the particles out of the airways.     Healthy airway: Airways are normally open. Air moves in and out easily.      Healthy cilia: Tiny, hairlike cilia sweep mucus and particles up and out of the airways.   Lungs with bronchitis  Bronchitis often occurs with a cold or the flu virus. The airways become inflamed (red and swollen). There is a deep hacking cough from the extra mucus. Other symptoms may include:  · Wheezing  · Chest discomfort  · Shortness of breath  · Mild fever  A second infection, this time due to bacteria, may then occur. And airways irritated by allergens or smoke are more likely to get infected.        Inflamed airway: Inflammation and extra mucus narrow the airway, causing shortness of breath.      Impaired cilia: Extra mucus impairs cilia, causing congestion and wheezing. Smoking makes the problem worse.   Making a diagnosis  A physical exam, health history, and certain tests help your healthcare provider make the diagnosis.  Health history  Your healthcare provider will ask you about your symptoms.  The exam  Your provider listens to your chest for signs of congestion. He or she may also check your ears, nose, and throat.  Possible tests  · A sputum test for bacteria. This requires a sample of mucus from your lungs.  · A nasal or throat swab. This tests to see if you have a bacterial infection.  · A chest X-ray. This is done if your healthcare provider thinks you have pneumonia.  · Tests to check for an underlying condition. Other tests may be done to check for things such as allergies, asthma, or  COPD (chronic obstructive pulmonary disease). You may need to see a specialist for more lung function testing.  Treating a cough  The main treatment for bronchitis is easing symptoms. Avoiding smoke, allergens, and other things that trigger coughing can often help. If the infection is bacterial, you may be given antibiotics. During the illness, it's important to get plenty of sleep. To ease symptoms:  · Dont smoke. Also avoid secondhand smoke.  · Use a humidifier. Or try breathing in steam from a hot shower. This may help loosen mucus.  · Drink a lot of water and juice. They can soothe the throat and may help thin mucus.  · Sit up or use extra pillows when in bed. This helps to lessen coughing and congestion.  · Ask your provider about using medicine. Ask about using cough medicine, pain and fever medicine, or a decongestant.  Antibiotics  Most cases of bronchitis are caused by cold or flu viruses. They dont need antibiotics to treat them, even if your mucus is thick and green or yellow. Antibiotics dont treat viral illness and antibiotics have not been shown to have any benefit in cases of acute bronchitis. Taking antibiotics when they are not needed increases your risk of getting an infection later that is antibiotic-resistant. Antibiotics can also cause severe cases of diarrhea that require other antibiotics to treat.  It is important that you accept your healthcare provider's opinion to not use antibiotics. Your provider will prescribe antibiotics if the infection is caused by bacteria. If they are prescribed:  · Take all of the medicine. Take the medicine until it is used up, even if symptoms have improved. If you dont, the bronchitis may come back.  · Take the medicines as directed. For instance, some medicines should be taken with food.  · Ask about side effects. Ask your provider or pharmacist what side effects are common, and what to do about them.  Follow-up care  You should see your provider again in  2 to 3 weeks. By this time, symptoms should have improved. An infection that lasts longer may mean you have a more serious problem.  Prevention  · Avoid tobacco smoke. If you smoke, quit. Stay away from smoky places. Ask friends and family not to smoke around you, or in your home or car.  · Get checked for allergies.  · Ask your provider about getting a yearly flu shot. Also ask about pneumococcal or pneumonia shots.  · Wash your hands often. This helps reduce the chance of picking up viruses that cause colds and flu.  Call your healthcare provider if:  · Symptoms worsen, or you have new symptoms  · Breathing problems worsen or  become severe  · Symptoms dont get better within a week, or within 3 days of taking antibiotics   Date Last Reviewed: 2/1/2017  © 6722-4769 [employer]. [address] [place] [address], [place] [address]. All rights reserved. This information is not intended as a substitute for professional medical care. Always follow your healthcare professional's instructions.

## 2018-09-03 NOTE — PATIENT INSTRUCTIONS
Follow up with your doctor in a few days.  Return to the urgent care or go to the ER if symptoms get worse.    TAKE RX ANTIBIOTIC FOR FULL DURATION-DOXYCYCLINE.  TAKE STEROIDS, STARTING TOMORROW, FOR 5 DAYS.  USE ALBUTEROL RESCUE INHALER EVERY 6 HOURS FOR WHEEZING WHEN NEEDED.    DRINK FLUIDS, GET REST.    TAKE MUCINEX DM FOR CHEST CONGESTION.      What Is Acute Bronchitis?  Acute bronchitis is when the airways in your lungs (bronchial tubes) become red and swollen (inflamed). It is usually caused by a viral infection. But it can also occur because of a bacteria or allergen. Symptoms include a cough that produces yellow or greenish mucus and can last for days or sometimes weeks.  Inside healthy lungs    Air travels in and out of the lungs through the airways. The linings of these airways produce sticky mucus. This mucus traps particles that enter the lungs. Tiny structures called cilia then sweep the particles out of the airways.     Healthy airway: Airways are normally open. Air moves in and out easily.      Healthy cilia: Tiny, hairlike cilia sweep mucus and particles up and out of the airways.   Lungs with bronchitis  Bronchitis often occurs with a cold or the flu virus. The airways become inflamed (red and swollen). There is a deep hacking cough from the extra mucus. Other symptoms may include:  · Wheezing  · Chest discomfort  · Shortness of breath  · Mild fever  A second infection, this time due to bacteria, may then occur. And airways irritated by allergens or smoke are more likely to get infected.        Inflamed airway: Inflammation and extra mucus narrow the airway, causing shortness of breath.      Impaired cilia: Extra mucus impairs cilia, causing congestion and wheezing. Smoking makes the problem worse.   Making a diagnosis  A physical exam, health history, and certain tests help your healthcare provider make the diagnosis.  Health history  Your healthcare provider will ask you about your symptoms.  The  exam  Your provider listens to your chest for signs of congestion. He or she may also check your ears, nose, and throat.  Possible tests  · A sputum test for bacteria. This requires a sample of mucus from your lungs.  · A nasal or throat swab. This tests to see if you have a bacterial infection.  · A chest X-ray. This is done if your healthcare provider thinks you have pneumonia.  · Tests to check for an underlying condition. Other tests may be done to check for things such as allergies, asthma, or COPD (chronic obstructive pulmonary disease). You may need to see a specialist for more lung function testing.  Treating a cough  The main treatment for bronchitis is easing symptoms. Avoiding smoke, allergens, and other things that trigger coughing can often help. If the infection is bacterial, you may be given antibiotics. During the illness, it's important to get plenty of sleep. To ease symptoms:  · Dont smoke. Also avoid secondhand smoke.  · Use a humidifier. Or try breathing in steam from a hot shower. This may help loosen mucus.  · Drink a lot of water and juice. They can soothe the throat and may help thin mucus.  · Sit up or use extra pillows when in bed. This helps to lessen coughing and congestion.  · Ask your provider about using medicine. Ask about using cough medicine, pain and fever medicine, or a decongestant.  Antibiotics  Most cases of bronchitis are caused by cold or flu viruses. They dont need antibiotics to treat them, even if your mucus is thick and green or yellow. Antibiotics dont treat viral illness and antibiotics have not been shown to have any benefit in cases of acute bronchitis. Taking antibiotics when they are not needed increases your risk of getting an infection later that is antibiotic-resistant. Antibiotics can also cause severe cases of diarrhea that require other antibiotics to treat.  It is important that you accept your healthcare provider's opinion to not use antibiotics. Your  provider will prescribe antibiotics if the infection is caused by bacteria. If they are prescribed:  · Take all of the medicine. Take the medicine until it is used up, even if symptoms have improved. If you dont, the bronchitis may come back.  · Take the medicines as directed. For instance, some medicines should be taken with food.  · Ask about side effects. Ask your provider or pharmacist what side effects are common, and what to do about them.  Follow-up care  You should see your provider again in 2 to 3 weeks. By this time, symptoms should have improved. An infection that lasts longer may mean you have a more serious problem.  Prevention  · Avoid tobacco smoke. If you smoke, quit. Stay away from smoky places. Ask friends and family not to smoke around you, or in your home or car.  · Get checked for allergies.  · Ask your provider about getting a yearly flu shot. Also ask about pneumococcal or pneumonia shots.  · Wash your hands often. This helps reduce the chance of picking up viruses that cause colds and flu.  Call your healthcare provider if:  · Symptoms worsen, or you have new symptoms  · Breathing problems worsen or  become severe  · Symptoms dont get better within a week, or within 3 days of taking antibiotics   Date Last Reviewed: 2/1/2017  © 8624-8208 [employer]. [address] [place] [address], [place] [address]. All rights reserved. This information is not intended as a substitute for professional medical care. Always follow your healthcare professional's instructions.

## 2020-07-28 ENCOUNTER — CLINICAL SUPPORT (OUTPATIENT)
Dept: SMOKING CESSATION | Facility: CLINIC | Age: 80
End: 2020-07-28
Payer: COMMERCIAL

## 2020-07-28 DIAGNOSIS — F17.200 NICOTINE DEPENDENCE: Primary | ICD-10-CM

## 2020-07-28 PROCEDURE — 99404 PREV MED CNSL INDIV APPRX 60: CPT | Mod: S$GLB,,,

## 2020-07-28 PROCEDURE — 99999 PR PBB SHADOW E&M-EST. PATIENT-LVL I: ICD-10-PCS | Mod: PBBFAC,,,

## 2020-07-28 PROCEDURE — 99999 PR PBB SHADOW E&M-EST. PATIENT-LVL I: CPT | Mod: PBBFAC,,,

## 2020-07-28 PROCEDURE — 99404 PR PREVENT COUNSEL,INDIV,60 MIN: ICD-10-PCS | Mod: S$GLB,,,

## 2020-07-28 RX ORDER — ASPIRIN/CALCIUM CARB/MAGNESIUM 325 MG
4 TABLET ORAL
Qty: 72 LOZENGE | Refills: 0 | Status: SHIPPED | OUTPATIENT
Start: 2020-07-28

## 2020-07-28 RX ORDER — VARENICLINE TARTRATE 0.5 (11)-1
KIT ORAL
Qty: 53 TABLET | Refills: 0 | Status: SHIPPED | OUTPATIENT
Start: 2020-07-28 | End: 2020-10-20

## 2020-07-30 ENCOUNTER — TELEPHONE (OUTPATIENT)
Dept: SMOKING CESSATION | Facility: CLINIC | Age: 80
End: 2020-07-30

## 2020-08-04 ENCOUNTER — CLINICAL SUPPORT (OUTPATIENT)
Dept: SMOKING CESSATION | Facility: CLINIC | Age: 80
End: 2020-08-04
Payer: COMMERCIAL

## 2020-08-04 DIAGNOSIS — F17.200 NICOTINE DEPENDENCE: Primary | ICD-10-CM

## 2020-08-04 PROCEDURE — 99999 PR PBB SHADOW E&M-EST. PATIENT-LVL I: CPT | Mod: PBBFAC,,,

## 2020-08-04 PROCEDURE — 99999 PR PBB SHADOW E&M-EST. PATIENT-LVL I: ICD-10-PCS | Mod: PBBFAC,,,

## 2020-08-04 PROCEDURE — 99401 PR PREVENT COUNSEL,INDIV,15 MIN: ICD-10-PCS | Mod: S$GLB,,,

## 2020-08-04 PROCEDURE — 99401 PREV MED CNSL INDIV APPRX 15: CPT | Mod: S$GLB,,,

## 2020-08-04 NOTE — PROGRESS NOTES
Individual Follow-Up Form    8/4/2020    Quit Date:     Clinical Status of Patient: Outpatient    Length of Service: 15 minutes    Continuing Medication: yes  Chantix    Other Medications:      Target Symptoms: Withdrawal and medication side effects. The following were  rated moderate (3) to severe (4) on TCRS:  · Moderate (3): none  · Severe (4): none    Comments: completion of TCRS (Tobacco Cessation Rating Scale) reviewed strategies, cues, and triggers. Introduced the negative impact of tobacco on health, the health advantages of discontinuing the use of tobacco, time line improved health changes after a quit, withdrawal issues to expect from nicotine and habit, and ways to achieve the goal of a quit. Started Chantix with no side effects.  Pt responding well. Kept good smoking log except when went to [facility] on Sunday and Monday.      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    
full range of motion in all extremities

## 2020-08-11 ENCOUNTER — CLINICAL SUPPORT (OUTPATIENT)
Dept: SMOKING CESSATION | Facility: CLINIC | Age: 80
End: 2020-08-11
Payer: COMMERCIAL

## 2020-08-11 DIAGNOSIS — F17.200 NICOTINE DEPENDENCE: Primary | ICD-10-CM

## 2020-08-11 PROCEDURE — 99402 PR PREVENT COUNSEL,INDIV,30 MIN: ICD-10-PCS | Mod: S$GLB,,,

## 2020-08-11 PROCEDURE — 99999 PR PBB SHADOW E&M-EST. PATIENT-LVL I: ICD-10-PCS | Mod: PBBFAC,,,

## 2020-08-11 PROCEDURE — 99402 PREV MED CNSL INDIV APPRX 30: CPT | Mod: S$GLB,,,

## 2020-08-11 PROCEDURE — 99999 PR PBB SHADOW E&M-EST. PATIENT-LVL I: CPT | Mod: PBBFAC,,,

## 2020-08-11 NOTE — PROGRESS NOTES
Individual Follow-Up Form    8/11/2020    Quit Date:     Clinical Status of Patient: Outpatient    Length of Service: 30 minutes    Continuing Medication: yes  Chantix or Nicotine Lozenges    Other Medications:      Target Symptoms: Withdrawal and medication side effects. The following were  rated moderate (3) to severe (4) on TCRS:  · Moderate (3): none  · Severe (4): none    Comments: completion of TCRS (Tobacco Cessation Rating Scale) reviewed strategies, controlling environment, cues, triggers, new goals set. Introduced high risk situations with preparation interventions, caffeine similarities with withdrawal issues of habit and nicotine, Alcohol, Understanding urges, cravings, stress and relaxation. Open discussion with intervention discussion. Has cut back.  In good spirits.  Chantix causing vivid dreams but not a problem.        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

## 2020-08-20 ENCOUNTER — CLINICAL SUPPORT (OUTPATIENT)
Dept: SMOKING CESSATION | Facility: CLINIC | Age: 80
End: 2020-08-20
Payer: COMMERCIAL

## 2020-08-20 DIAGNOSIS — F17.200 NICOTINE DEPENDENCE: Primary | ICD-10-CM

## 2020-08-20 PROCEDURE — 99999 PR PBB SHADOW E&M-EST. PATIENT-LVL I: CPT | Mod: PBBFAC,,,

## 2020-08-20 PROCEDURE — 99403 PREV MED CNSL INDIV APPRX 45: CPT | Mod: S$GLB,,,

## 2020-08-20 PROCEDURE — 99999 PR PBB SHADOW E&M-EST. PATIENT-LVL I: ICD-10-PCS | Mod: PBBFAC,,,

## 2020-08-20 PROCEDURE — 99403 PR PREVENT COUNSEL,INDIV,45 MIN: ICD-10-PCS | Mod: S$GLB,,,

## 2020-08-20 RX ORDER — VARENICLINE TARTRATE 1 MG/1
1 TABLET, FILM COATED ORAL DAILY
Qty: 56 TABLET | Refills: 0 | Status: SHIPPED | OUTPATIENT
Start: 2020-08-20 | End: 2020-09-28 | Stop reason: SDUPTHER

## 2020-08-20 NOTE — PROGRESS NOTES
"Individual Follow-Up Form    8/20/2020    Quit Date:     Clinical Status of Patient: Outpatient    Length of Service: 45 minutes    Continuing Medication: yes  Chantix or Nicotine Lozenges    Other Medications:      Target Symptoms: Withdrawal and medication side effects. The following were  rated moderate (3) to severe (4) on TCRS:  · Moderate (3): none  · Severe (4): none    Comments: completion of TCRS (Tobacco Cessation Rating Scale) reviewed strategies, controlling environment, cues, triggers, new goals set. Introduced high risk situations with preparation interventions, caffeine similarities with withdrawal issues of habit and nicotine, Alcohol, Understanding urges, cravings, stress and relaxation. Open discussion with intervention discussion. Pt plans to [place]d less since she is not winning; this will help cut down on smoking since she smokes when gambles.  Practicing delays with urges; happens with some cigarettes but not all.  Has cut back to 10-15 per day.  Smokes 1/2 a cigarette sometimes and then finishes other half.  Coughing has decreased.  Not buying a pack every day.  Has decided to continue therapy for knee 2x per week for 24 more session.  Was going to stop but decided it's good to get out of house and good exercise.  Not sure if [facility] back [facility] to work as .      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

## 2020-09-28 ENCOUNTER — CLINICAL SUPPORT (OUTPATIENT)
Dept: SMOKING CESSATION | Facility: CLINIC | Age: 80
End: 2020-09-28
Payer: COMMERCIAL

## 2020-09-28 DIAGNOSIS — F17.200 NICOTINE DEPENDENCE: ICD-10-CM

## 2020-09-28 PROCEDURE — 99402 PREV MED CNSL INDIV APPRX 30: CPT | Mod: S$GLB,,,

## 2020-09-28 PROCEDURE — 99999 PR PBB SHADOW E&M-EST. PATIENT-LVL I: CPT | Mod: PBBFAC,,,

## 2020-09-28 PROCEDURE — 99402 PR PREVENT COUNSEL,INDIV,30 MIN: ICD-10-PCS | Mod: S$GLB,,,

## 2020-09-28 PROCEDURE — 99999 PR PBB SHADOW E&M-EST. PATIENT-LVL I: ICD-10-PCS | Mod: PBBFAC,,,

## 2020-09-28 RX ORDER — VARENICLINE TARTRATE 1 MG/1
1 TABLET, FILM COATED ORAL 2 TIMES DAILY
Qty: 56 TABLET | Refills: 0 | Status: SHIPPED | OUTPATIENT
Start: 2020-09-28 | End: 2020-10-20

## 2020-10-05 ENCOUNTER — CLINICAL SUPPORT (OUTPATIENT)
Dept: SMOKING CESSATION | Facility: CLINIC | Age: 80
End: 2020-10-05
Payer: COMMERCIAL

## 2020-10-05 DIAGNOSIS — F17.200 NICOTINE DEPENDENCE: Primary | ICD-10-CM

## 2020-10-05 PROCEDURE — 99999 PR PBB SHADOW E&M-EST. PATIENT-LVL I: CPT | Mod: PBBFAC,,,

## 2020-10-05 PROCEDURE — 99402 PREV MED CNSL INDIV APPRX 30: CPT | Mod: S$GLB,,,

## 2020-10-05 PROCEDURE — 99999 PR PBB SHADOW E&M-EST. PATIENT-LVL I: ICD-10-PCS | Mod: PBBFAC,,,

## 2020-10-05 PROCEDURE — 99402 PR PREVENT COUNSEL,INDIV,30 MIN: ICD-10-PCS | Mod: S$GLB,,,

## 2020-10-05 NOTE — PROGRESS NOTES
Individual Follow-Up Form    10/5/2020    Quit Date:     Clinical Status of Patient: Outpatient    Length of Service: 30 minutes    Continuing Medication: yes  Chantix    Other Medications:      Target Symptoms: Withdrawal and medication side effects. The following were  rated moderate (3) to severe (4) on TCRS:  · Moderate (3): none  · Severe (4): none    Comments: completion of TCRS (Tobacco Cessation Rating Scale) reviewed strategies, habitual behavior, stress, and high risk situations. Introduced stress with addition interventions, SOLVE, relaxation with interventions, nutrition, exercise, weight gain, and the importance of rewarding oneself for accomplishments toward becoming tobacco free. Open discussion of all items with interventions. Pt's back went out and she has been applying ice/heat since last Tuesday.  Missed 2 PT appts last week, but states will put on back brace and return to PT, which is in a clinic on [facility],  this week.  Being home and stuck inside has made smoking less more difficult, but she is still keeping her daily smoke rate to a minimum.  Missed going to [employer] this past weekend.  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

## 2020-10-19 ENCOUNTER — CLINICAL SUPPORT (OUTPATIENT)
Dept: SMOKING CESSATION | Facility: CLINIC | Age: 80
End: 2020-10-19
Payer: COMMERCIAL

## 2020-10-19 DIAGNOSIS — F17.200 NICOTINE DEPENDENCE: Primary | ICD-10-CM

## 2020-10-19 PROCEDURE — 99402 PR PREVENT COUNSEL,INDIV,30 MIN: ICD-10-PCS | Mod: S$GLB,,,

## 2020-10-19 PROCEDURE — 99999 PR PBB SHADOW E&M-EST. PATIENT-LVL I: CPT | Mod: PBBFAC,,,

## 2020-10-19 PROCEDURE — 99402 PREV MED CNSL INDIV APPRX 30: CPT | Mod: S$GLB,,,

## 2020-10-19 PROCEDURE — 99999 PR PBB SHADOW E&M-EST. PATIENT-LVL I: ICD-10-PCS | Mod: PBBFAC,,,

## 2020-10-19 NOTE — PROGRESS NOTES
"Individual Follow-Up Form    10/19/2020    Quit Date:     Clinical Status of Patient: Outpatient    Length of Service: 30 minutes    Continuing Medication: yes  Chantix or Nicotine Lozenges    Other Medications:      Target Symptoms: Withdrawal and medication side effects. The following were  rated moderate (3) to severe (4) on TCRS:  · Moderate (3): none  · Severe (4): none    Comments: completion of TCRS (Tobacco Cessation Rating Scale) reviewed strategies, habitual behavior, stress, and high risk situations. Introduced stress with addition interventions, SOLVE, relaxation with interventions, nutrition, exercise, weight gain, and the importance of rewarding oneself for accomplishments toward becoming tobacco free. Open discussion of all items with interventions. Pt still has low back pain but it has improved since last time.  Pt has "quit partner," that is man who lives across from her and is Ruthann's pt.  Pt has cut back on smoking but still not able to make the plunge to quit.  Feels good about progress.      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

## 2020-10-20 DIAGNOSIS — F17.200 NICOTINE DEPENDENCE: Primary | ICD-10-CM

## 2020-10-20 RX ORDER — VARENICLINE TARTRATE 1 MG/1
1 TABLET, FILM COATED ORAL 2 TIMES DAILY
Qty: 56 TABLET | Refills: 0 | Status: SHIPPED | OUTPATIENT
Start: 2020-10-20 | End: 2020-11-30 | Stop reason: SDUPTHER

## 2020-11-02 ENCOUNTER — TELEPHONE (OUTPATIENT)
Dept: SMOKING CESSATION | Facility: CLINIC | Age: 80
End: 2020-11-02

## 2020-11-02 NOTE — TELEPHONE ENCOUNTER
Called pt twice at 3pm scheduled apt. Time. Got same automated message stating call could not be completed at this time, please try again later.  I then attempted to call mobile phone number, but got a message saying invalid #.  Called home number a third time and still same message.

## 2020-11-16 ENCOUNTER — CLINICAL SUPPORT (OUTPATIENT)
Dept: SMOKING CESSATION | Facility: CLINIC | Age: 80
End: 2020-11-16
Payer: COMMERCIAL

## 2020-11-16 DIAGNOSIS — F17.200 NICOTINE DEPENDENCE: Primary | ICD-10-CM

## 2020-11-16 PROCEDURE — 99402 PR PREVENT COUNSEL,INDIV,30 MIN: ICD-10-PCS | Mod: S$GLB,,,

## 2020-11-16 PROCEDURE — 99402 PREV MED CNSL INDIV APPRX 30: CPT | Mod: S$GLB,,,

## 2020-11-16 NOTE — PROGRESS NOTES
Individual Follow-Up Form    11/16/2020    Quit Date:     Clinical Status of Patient: Outpatient    Length of Service: 45 minutes    Continuing Medication: yes  Chantix    Other Medications:      Target Symptoms: Withdrawal and medication side effects. The following were  rated moderate (3) to severe (4) on TCRS:  · Moderate (3): none  · Severe (4): none    Comments: completion of TCRS (Tobacco Cessation Rating Scale) reviewed strategies, habitual behavior, high risks situations, understanding urges and cravings, stress and relaxation with open discussion and additional interventions, Introduced lapses, relapses, understanding them and analyzing the situation of a lapse, conflict issues that may be linked to a lapse.  set goal to get back to half pack per day. [place] and a week of  house guests caused increase.  Was at 1/2 pack prior to that.  Luis Carlos is neighbor that is going through program with Ruthann      Diagnosis: F17.210    Next Visit: 2 weeks

## 2020-11-30 ENCOUNTER — CLINICAL SUPPORT (OUTPATIENT)
Dept: SMOKING CESSATION | Facility: CLINIC | Age: 80
End: 2020-11-30
Payer: COMMERCIAL

## 2020-11-30 DIAGNOSIS — F17.200 NICOTINE DEPENDENCE: ICD-10-CM

## 2020-11-30 PROCEDURE — 99402 PR PREVENT COUNSEL,INDIV,30 MIN: ICD-10-PCS | Mod: S$GLB,,,

## 2020-11-30 PROCEDURE — 99402 PREV MED CNSL INDIV APPRX 30: CPT | Mod: S$GLB,,,

## 2020-11-30 RX ORDER — VARENICLINE TARTRATE 1 MG/1
1 TABLET, FILM COATED ORAL 2 TIMES DAILY
Qty: 56 TABLET | Refills: 0 | Status: SHIPPED | OUTPATIENT
Start: 2020-11-30 | End: 2021-01-21 | Stop reason: SDUPTHER

## 2020-11-30 NOTE — PROGRESS NOTES
Individual Follow-Up Form    11/30/2020    Quit Date:     Clinical Status of Patient: Outpatient    Length of Service: 30 minutes    Continuing Medication: yes  Chantix    Other Medications:      Target Symptoms: Withdrawal and medication side effects. The following were  rated moderate (3) to severe (4) on TCRS:  · Moderate (3): none  · Severe (4): none    Comments: completion of TCRS (Tobacco Cessation Rating Scale) reviewed strategies, controlling environment, cues, triggers, new goals set. Introduced high risk situations with preparation interventions, caffeine similarities with withdrawal issues of habit and nicotine, Alcohol, Understanding urges, cravings, stress and relaxation. Open discussion with intervention discussion. Patient is at about 12 per day now.      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

## 2020-12-15 ENCOUNTER — CLINICAL SUPPORT (OUTPATIENT)
Dept: SMOKING CESSATION | Facility: CLINIC | Age: 80
End: 2020-12-15
Payer: COMMERCIAL

## 2020-12-15 DIAGNOSIS — F17.200 NICOTINE DEPENDENCE: Primary | ICD-10-CM

## 2020-12-15 PROCEDURE — 99403 PREV MED CNSL INDIV APPRX 45: CPT | Mod: S$GLB,,,

## 2020-12-15 PROCEDURE — 99403 PR PREVENT COUNSEL,INDIV,45 MIN: ICD-10-PCS | Mod: S$GLB,,,

## 2020-12-15 NOTE — PROGRESS NOTES
"Individual Follow-Up Form    12/15/2020    Quit Date:     Clinical Status of Patient: Outpatient    Length of Service: 45 minutes    Continuing Medication: yes  Chantix    Other Medications:      Target Symptoms: Withdrawal and medication side effects. The following were  rated moderate (3) to severe (4) on TCRS:  · Moderate (3): none  · Severe (4): none    Comments: completion of TCRS (Tobacco Cessation Rating Scale) reviewed strategies, habitual behavior, stress, and high risk situations. Introduced stress with addition interventions, SOLVE, relaxation with interventions, nutrition, exercise, weight gain, and the importance of rewarding oneself for accomplishments toward becoming tobacco free. Open discussion of all items with interventions. Doing well, has cut back again.  Discussed keeping busy. Stated, "Finding less you do, the less you want to do." Very upbeat positive discussion and thoughts around process.      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

## 2020-12-29 ENCOUNTER — TELEPHONE (OUTPATIENT)
Dept: SMOKING CESSATION | Facility: CLINIC | Age: 80
End: 2020-12-29

## 2020-12-29 DIAGNOSIS — F17.200 NICOTINE DEPENDENCE: Primary | ICD-10-CM

## 2020-12-29 NOTE — TELEPHONE ENCOUNTER
Pt returned my call stating she was back at home. I did not take the call as I was with new pt intake during that time.  Will set up another apt.

## 2021-01-21 ENCOUNTER — CLINICAL SUPPORT (OUTPATIENT)
Dept: SMOKING CESSATION | Facility: CLINIC | Age: 81
End: 2021-01-21
Payer: COMMERCIAL

## 2021-01-21 DIAGNOSIS — F17.200 NICOTINE DEPENDENCE: Primary | ICD-10-CM

## 2021-01-21 PROCEDURE — 99403 PREV MED CNSL INDIV APPRX 45: CPT | Mod: S$GLB,,,

## 2021-01-21 PROCEDURE — 99403 PR PREVENT COUNSEL,INDIV,45 MIN: ICD-10-PCS | Mod: S$GLB,,,

## 2021-01-21 RX ORDER — VARENICLINE TARTRATE 1 MG/1
1 TABLET, FILM COATED ORAL 2 TIMES DAILY
Qty: 56 TABLET | Refills: 0 | Status: SHIPPED | OUTPATIENT
Start: 2021-01-21 | End: 2021-03-08 | Stop reason: SDUPTHER

## 2021-02-04 ENCOUNTER — CLINICAL SUPPORT (OUTPATIENT)
Dept: SMOKING CESSATION | Facility: CLINIC | Age: 81
End: 2021-02-04
Payer: COMMERCIAL

## 2021-02-04 DIAGNOSIS — F17.200 NICOTINE DEPENDENCE: Primary | ICD-10-CM

## 2021-02-04 PROCEDURE — 99402 PREV MED CNSL INDIV APPRX 30: CPT | Mod: S$GLB,,,

## 2021-02-04 PROCEDURE — 99402 PR PREVENT COUNSEL,INDIV,30 MIN: ICD-10-PCS | Mod: S$GLB,,,

## 2021-02-19 ENCOUNTER — CLINICAL SUPPORT (OUTPATIENT)
Dept: SMOKING CESSATION | Facility: CLINIC | Age: 81
End: 2021-02-19
Payer: COMMERCIAL

## 2021-02-19 DIAGNOSIS — F17.200 NICOTINE DEPENDENCE: Primary | ICD-10-CM

## 2021-02-19 PROCEDURE — 99401 PR PREVENT COUNSEL,INDIV,15 MIN: ICD-10-PCS | Mod: S$GLB,,,

## 2021-02-19 PROCEDURE — 99401 PREV MED CNSL INDIV APPRX 15: CPT | Mod: S$GLB,,,

## 2021-02-26 ENCOUNTER — CLINICAL SUPPORT (OUTPATIENT)
Dept: SMOKING CESSATION | Facility: CLINIC | Age: 81
End: 2021-02-26
Payer: COMMERCIAL

## 2021-02-26 DIAGNOSIS — F17.200 NICOTINE DEPENDENCE: Primary | ICD-10-CM

## 2021-02-26 PROCEDURE — 99402 PREV MED CNSL INDIV APPRX 30: CPT | Mod: S$GLB,,,

## 2021-02-26 PROCEDURE — 99402 PR PREVENT COUNSEL,INDIV,30 MIN: ICD-10-PCS | Mod: S$GLB,,,

## 2021-03-08 ENCOUNTER — CLINICAL SUPPORT (OUTPATIENT)
Dept: SMOKING CESSATION | Facility: CLINIC | Age: 81
End: 2021-03-08
Payer: COMMERCIAL

## 2021-03-08 DIAGNOSIS — F17.200 NICOTINE DEPENDENCE: ICD-10-CM

## 2021-03-08 PROCEDURE — 99402 PREV MED CNSL INDIV APPRX 30: CPT | Mod: S$GLB,,,

## 2021-03-08 PROCEDURE — 99402 PR PREVENT COUNSEL,INDIV,30 MIN: ICD-10-PCS | Mod: S$GLB,,,

## 2021-03-08 RX ORDER — VARENICLINE TARTRATE 1 MG/1
1 TABLET, FILM COATED ORAL 2 TIMES DAILY
Qty: 56 TABLET | Refills: 0 | Status: SHIPPED | OUTPATIENT
Start: 2021-03-08 | End: 2021-03-24 | Stop reason: SDUPTHER

## 2021-03-24 ENCOUNTER — CLINICAL SUPPORT (OUTPATIENT)
Dept: SMOKING CESSATION | Facility: CLINIC | Age: 81
End: 2021-03-24
Payer: COMMERCIAL

## 2021-03-24 DIAGNOSIS — F17.200 NICOTINE DEPENDENCE: ICD-10-CM

## 2021-03-24 PROCEDURE — 99402 PREV MED CNSL INDIV APPRX 30: CPT | Mod: S$GLB,,,

## 2021-03-24 PROCEDURE — 99402 PR PREVENT COUNSEL,INDIV,30 MIN: ICD-10-PCS | Mod: S$GLB,,,

## 2021-03-24 RX ORDER — VARENICLINE TARTRATE 1 MG/1
1 TABLET, FILM COATED ORAL 2 TIMES DAILY
Qty: 56 TABLET | Refills: 0 | Status: SHIPPED | OUTPATIENT
Start: 2021-03-24 | End: 2021-05-26 | Stop reason: SDUPTHER

## 2021-04-12 ENCOUNTER — CLINICAL SUPPORT (OUTPATIENT)
Dept: SMOKING CESSATION | Facility: CLINIC | Age: 81
End: 2021-04-12
Payer: COMMERCIAL

## 2021-04-12 DIAGNOSIS — F17.200 NICOTINE DEPENDENCE: Primary | ICD-10-CM

## 2021-04-12 PROCEDURE — 99404 PREV MED CNSL INDIV APPRX 60: CPT | Mod: S$GLB,,,

## 2021-04-12 PROCEDURE — 99404 PR PREVENT COUNSEL,INDIV,60 MIN: ICD-10-PCS | Mod: S$GLB,,,

## 2021-05-03 ENCOUNTER — CLINICAL SUPPORT (OUTPATIENT)
Dept: SMOKING CESSATION | Facility: CLINIC | Age: 81
End: 2021-05-03
Payer: COMMERCIAL

## 2021-05-03 DIAGNOSIS — F17.200 NICOTINE DEPENDENCE: Primary | ICD-10-CM

## 2021-05-03 PROCEDURE — 99404 PREV MED CNSL INDIV APPRX 60: CPT | Mod: S$GLB,,,

## 2021-05-03 PROCEDURE — 99404 PR PREVENT COUNSEL,INDIV,60 MIN: ICD-10-PCS | Mod: S$GLB,,,

## 2021-05-26 ENCOUNTER — CLINICAL SUPPORT (OUTPATIENT)
Dept: SMOKING CESSATION | Facility: CLINIC | Age: 81
End: 2021-05-26
Payer: COMMERCIAL

## 2021-05-26 DIAGNOSIS — F17.200 NICOTINE DEPENDENCE: ICD-10-CM

## 2021-05-26 DIAGNOSIS — F17.200 NICOTINE DEPENDENCE: Primary | ICD-10-CM

## 2021-05-26 PROCEDURE — 99402 PR PREVENT COUNSEL,INDIV,30 MIN: ICD-10-PCS | Mod: S$GLB,,,

## 2021-05-26 PROCEDURE — 99402 PREV MED CNSL INDIV APPRX 30: CPT | Mod: S$GLB,,,

## 2021-05-26 RX ORDER — VARENICLINE TARTRATE 1 MG/1
1 TABLET, FILM COATED ORAL 2 TIMES DAILY
Qty: 56 TABLET | Refills: 0 | Status: SHIPPED | OUTPATIENT
Start: 2021-05-26 | End: 2021-06-23 | Stop reason: SDUPTHER

## 2021-06-09 ENCOUNTER — CLINICAL SUPPORT (OUTPATIENT)
Dept: SMOKING CESSATION | Facility: CLINIC | Age: 81
End: 2021-06-09
Payer: COMMERCIAL

## 2021-06-09 DIAGNOSIS — F17.200 NICOTINE DEPENDENCE: Primary | ICD-10-CM

## 2021-06-09 PROCEDURE — 99402 PR PREVENT COUNSEL,INDIV,30 MIN: ICD-10-PCS | Mod: S$GLB,,,

## 2021-06-09 PROCEDURE — 99402 PREV MED CNSL INDIV APPRX 30: CPT | Mod: S$GLB,,,

## 2021-06-23 ENCOUNTER — TELEPHONE (OUTPATIENT)
Dept: SMOKING CESSATION | Facility: CLINIC | Age: 81
End: 2021-06-23

## 2021-06-23 ENCOUNTER — CLINICAL SUPPORT (OUTPATIENT)
Dept: SMOKING CESSATION | Facility: CLINIC | Age: 81
End: 2021-06-23
Payer: COMMERCIAL

## 2021-06-23 DIAGNOSIS — F17.200 NICOTINE DEPENDENCE: ICD-10-CM

## 2021-06-23 DIAGNOSIS — F17.200 NICOTINE DEPENDENCE: Primary | ICD-10-CM

## 2021-06-23 PROCEDURE — 99999 PR PBB SHADOW E&M-EST. PATIENT-LVL I: ICD-10-PCS | Mod: PBBFAC,,,

## 2021-06-23 PROCEDURE — 99999 PR PBB SHADOW E&M-EST. PATIENT-LVL I: CPT | Mod: PBBFAC,,,

## 2021-06-23 PROCEDURE — 99401 PREV MED CNSL INDIV APPRX 15: CPT | Mod: S$GLB,,,

## 2021-06-23 PROCEDURE — 99401 PR PREVENT COUNSEL,INDIV,15 MIN: ICD-10-PCS | Mod: S$GLB,,,

## 2021-06-23 RX ORDER — VARENICLINE TARTRATE 1 MG/1
1 TABLET, FILM COATED ORAL 2 TIMES DAILY
Qty: 56 TABLET | Refills: 0 | Status: SHIPPED | OUTPATIENT
Start: 2021-06-23

## 2021-07-08 ENCOUNTER — CLINICAL SUPPORT (OUTPATIENT)
Dept: SMOKING CESSATION | Facility: CLINIC | Age: 81
End: 2021-07-08
Payer: COMMERCIAL

## 2021-07-08 DIAGNOSIS — F17.200 NICOTINE DEPENDENCE: Primary | ICD-10-CM

## 2021-07-08 PROCEDURE — 99401 PREV MED CNSL INDIV APPRX 15: CPT | Mod: S$GLB,,,

## 2021-07-08 PROCEDURE — 99401 PR PREVENT COUNSEL,INDIV,15 MIN: ICD-10-PCS | Mod: S$GLB,,,

## 2022-04-21 NOTE — PROGRESS NOTES
Individual Follow-Up Form    9/28/2020    Quit Date:     Clinical Status of Patient: Outpatient    Length of Service: 30 minutes    Continuing Medication: yes  Chantix    Other Medications:      Target Symptoms: Withdrawal and medication side effects. The following were  rated moderate (3) to severe (4) on TCRS:  · Moderate (3): none  · Severe (4): none    Comments: completion of TCRS (Tobacco Cessation Rating Scale) reviewed strategies, controlling environment, cues, triggers, new goals set. Introduced high risk situations with preparation interventions, caffeine similarities with withdrawal issues of habit and nicotine, Alcohol, Understanding urges, cravings, stress and relaxation. Open discussion with intervention discussion. Pt still smoking 10 per day and mad with self.  Went to [employer] last week.       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    
no

## 2025-02-27 ENCOUNTER — TELEPHONE (OUTPATIENT)
Dept: ENDOSCOPY | Facility: HOSPITAL | Age: 85
End: 2025-02-27
Payer: MEDICARE

## 2025-02-27 VITALS — BODY MASS INDEX: 28.68 KG/M2 | WEIGHT: 168 LBS | HEIGHT: 64 IN

## 2025-02-27 DIAGNOSIS — R19.5 OCCULT BLOOD POSITIVE STOOL: Primary | ICD-10-CM

## 2025-02-27 DIAGNOSIS — Z12.11 ENCOUNTER FOR SCREENING COLONOSCOPY: Primary | ICD-10-CM

## 2025-02-27 RX ORDER — SODIUM, POTASSIUM,MAG SULFATES 17.5-3.13G
1 SOLUTION, RECONSTITUTED, ORAL ORAL DAILY
Qty: 1 KIT | Refills: 0 | Status: SHIPPED | OUTPATIENT
Start: 2025-02-27 | End: 2025-03-01

## 2025-02-27 NOTE — TELEPHONE ENCOUNTER
.    Colonoscopy Procedure Prep Instructions      Date of procedure: 3/25/25 Arrive at: 8:30AM    Location of Department:   [facility] [address] [place] [address]  Take the Elevators to 2nd Floor Endoscopy Procedural Area    As soon as possible:   your prep from pharmacy and over the counter DULCOLAX LAXATIVE TABLETS     On the day before your procedure   What You CAN do:   You may have clear liquids ONLY -see below for list.     Liquids That Are OK to Drink:   Water  Sports drinks (Gatorade, Power-Aid)  Coffee or tea (no cream or nondairy creamer)  Clear juices without pulp (apple, white grape)  Gelatin desserts (no fruit or toppings)  Clear soda (sprite, coke, ginger ale)  Chicken broth (until 12 midnight the night before procedure)      What You CANNOT do:   Do not EAT solid food, drink milk or anything   colored red.  Do not drink alcohol.  Do not take oral medications within 1 hour of starting   each dose of SUPREP.  No gum chewing or candy morning of procedure.      Note:   (Please disregard the insert instructions from pharmacy).  SUPREP Bowel Prep Kit is indicated for cleansing of the colon as a preparation for colonoscopy in adults.   Be sure to tell your doctor about all the medicines you take, including prescription and non-prescription medicines, vitamins, and herbal supplements. SUPREP Bowel Prep Kit may affect how other medicines work.  Medication taken by mouth may not be absorbed properly when taken within 1 hour before the start of each dose of SUPREP Bowel Prep Kit.    It is not uncommon to experience some abdominal cramping, nausea and/or vomiting when taking the prep. If you have nausea and/or vomiting while taking the prep, stop drinking for 20 to 30 minutes then continue.    How to take prep:    SUPREP Bowel Prep Kit is a (2-day) prep.   Both 6-ounce bottles are required for a complete preparation for colonoscopy. Dilute the solution concentrate as  directed prior to use. You must drink water with each dose of SUPREP, and additional water after each dose.    DOSE 1--Day Before Colonoscopy 3/24/25    Drink at least 6 to 8 glasses of clear liquids from time you wake up until you begin your prep and then continue until bedtime to avoid dehydration.     12:00 pm (NOON) Take four (4) Dulcolax (Bisacodyl) tablets with at least 8 ounces or more of clear liquids.      6:00 pm:    You must complete Steps 1 through 4 using one (1) 6-ounce bottle before going to bed as shown below:    Step 1-Pour ONE (1) 6-ounce bottle of SUPREP liquid into the mixing container.  Step 2-Add cool drinking water to the 16-ounce line on the container and mix.  Step 3-Drink ALL the liquid in the container.  Step 4-You must drink two (2) more 16-ounce containers of water over the next 1 hour.  IMPORTANT: If you experience preparation-related symptoms (for example, nausea, bloating, or cramping), stop, or slow the rate of drinking the additional water until your symptoms decrease.    DOSE 2--Day of the Colonoscopy 3/25/25 at 2-3 AM.    For this dose, repeat Steps 1 through 4 shown above using the other 6-ounce bottle.   You may continue drinking water/clear liquids until   2 hours before your colonoscopy or as directed by the scheduling nurse  7:30AM.    For information about your procedure, two (2) things to view prior to colonoscopy:  Please watch this informational video. It is important to watch this animated consent video prior to your arrival. If you haven't watched the video prior to arriving, you are required to watch it during admission which can causes delays.    Options for viewing:   Using a keyboard:  press and hold the control tab (Ctrl) and left mouse click to follow links.           Colonoscopy Instructional Video                                                                                   OR    Type link address into your web browser's address  bar:  [email or web address]      Educational Booklet with pictures:      Colonoscopy Prep - Liquid      Comments:   .     IMPORTANT INFORMATION TO KNOW BEFORE YOUR PROCEDURE    [facility] 2nd Floor         If your procedure requires the administration of anesthesia, it is necessary for a responsible adult to drive you home. (Medical Transportation, Uber, Lyft, Taxi, etc. may ONLY be used if a responsible adult is present to accompany you home.  The responsible adult CAN'T be the  of the service).      person must be available to return to pick you up within 15 minutes of being notified of discharge.       Please bring a picture ID, insurance card, & copayment      Take Medications as directed below:        If you begin taking any blood thinning medications, injectable weight loss/diabetes medications (other than insulin) , or Adipex (Phentermine) please contact the endoscopy scheduling department listed below as soon as possible.    If you are diabetic see the attached instruction sheet regarding your medication.     If you take HEART, BLOOD PRESSURE, SEIZURE, PAIN, LUNG (including inhalers/nebulizers), ANTI-REJECTION (transplant patients), or PSYCHIATRIC medications, please take at your regular times with a sip of water or as directed by the scheduling nurse.     Important contact information:    Endoscopy [phone] Hours of operation Monday-Friday 8:00-4:30pm.    Questions about insurance or financial obligations call [phone] or [phone].    If you have questions regarding the prep or need to reschedule, please call [phone]. After hours questions requiring immediate assistance, contact [facility] nurse line at [phone] or [phone].   NOTE:     On occasion, unforeseen circumstances may cause a delay in your procedure start time. We respect your time and appreciate your patience during these  circumstances.      Comments:

## 2025-02-27 NOTE — TELEPHONE ENCOUNTER
From: Bethany Frances   Sent: 2/6/2025  12:06 PM CST   To: [facility] Endoscopy Schedulers   Subject: Referral                                         Good morning,     Dr. Delfino Wills would like to refer the following patient to the Gastro (Colonoscopy) department. The patients diagnosis is R19.5 Occult blood positive stool. I have scanned the patients referral and records into .      Thanks,     Bethany Segovia

## 2025-02-27 NOTE — TELEPHONE ENCOUNTER
Referral for procedure from [facility]      Spoke to patient to schedule procedure(s) Colonoscopy       Physician to perform procedure(s) Dr. MATTEO Gaming  Date of Procedure (s) 3/25/25  Arrival Time 8:30 AM  Time of Procedure(s) 9:30 AM   Location of Procedure(s) Derma 2nd Floor  Type of Rx Prep sent to patient: Suprep  Instructions provided to [facility] via [facility]    Patient was informed on the following information and verbalized understanding. Screening questionnaire reviewed with patient and complete. If procedure requires anesthesia, a responsible adult needs to be present to accompany the patient home, patient cannot drive after receiving anesthesia. Appointment details are tentative, especially check-in time. Patient will receive a prep-op call 7 days prior to confirm check-in time for procedure. If applicable the patient should contact their pharmacy to verify Rx for procedure prep is ready for pick-up. Patient was advised to call the scheduling department at [phone] if pharmacy states no Rx is available. Patient was advised to call the endoscopy scheduling department if any questions or concerns arise.       Endoscopy Scheduling Department

## 2025-02-28 ENCOUNTER — TELEPHONE (OUTPATIENT)
Dept: ENDOSCOPY | Facility: HOSPITAL | Age: 85
End: 2025-02-28
Payer: MEDICARE

## 2025-02-28 DIAGNOSIS — Z12.11 SPECIAL SCREENING FOR MALIGNANT NEOPLASMS, COLON: Primary | ICD-10-CM

## 2025-02-28 RX ORDER — SOD SULF/POT CHLORIDE/MAG SULF 1.479 G
12 TABLET ORAL DAILY
Qty: 24 TABLET | Refills: 0 | Status: SHIPPED | OUTPATIENT
Start: 2025-02-28

## 2025-02-28 NOTE — TELEPHONE ENCOUNTER
Changed colonoscopy prep to sutab pre pt request. Instructed pt to inform pharmacy to run as cash and use coupon for discounted price        Colonoscopy Procedure Prep Instructions    Date of procedure: 3/25/25 Arrive at: 8:30AM    Location of Department:   [facility] [address] [place] [address]  Take the Elevators to 2nd Floor Endoscopy Procedural Area    As soon as possible:   your prep from the pharmacy          On the day before your procedure   What NOT to do:   Do not EAT solid food, drink milk or anything   colored red.  Do not drink alcohol.  Do not take other laxatives while taking SUTAB.  Do not take oral medications within 1 hour of starting   each dose of SUTAB.  No gum chewing or candy morning of procedure  If taking tetracycline or fluoroquinolone antibiotics, iron, digoxin, chlorpromazine, or penicillamine, take these medications at least 2 hours before and not less than 6 hours after administration of each dose of SUTAB.    DO:   Drink clear liquids ONLY - Drink at least 6 to 8 glasses of clear liquids from time you wake up until you begin your prep to avoid dehydration.     Liquids That Are OK to Drink:   Water  Sports drinks (Gatorade, Power-Aid)  Coffee or tea (no cream or nondairy creamer)  Clear juices without pulp (apple, white grape)  Gelatin desserts (no fruit or toppings)  Clear soda (sprite, coke, ginger ale)  Chicken broth (until 12 midnight the night before procedure)    (Please disregard the insert instructions from pharmacy and follow these instructions).    Note:   SUTAB is an osmotic laxative indicated for cleansing of the colon in preparation for colonoscopy in adults.  Medication taken by mouth may not be absorbed properly when taken within 1 hour before the start of each dose of   SUTAB.    IMPORTANT: If you experience preparation-related symptoms (for example, nausea, bloating, or cramping), pause, or slow the rate of drinking the  additional water until your symptoms decrease.    How to take prep:    SUTAB is a (2-day) prep. A total of  24 tablets are required for complete preparation for   colonoscopy.     DOSE 1--Day Before Colonoscopy 3/24/25  at 5:00 pm    STEP 1 Open 1 bottle of 12 tablets.    STEP 2 Fill the provided container with 16 ounces of water (up to the fill line). Swallow 1 tablet every 1 to 2 minutes.  You should finish the 12 tablets and the entire 16 ounces of water within 20 minutes.    STEP 3 After 1 hour of completing step 2, drink at least   32 ounces of additional water/clear liquids until bedtime, but more is better.     DOSE 2--Day of the Colonoscopy 3/25/25 at 12 AM (midnight)-3 AM.    STEP 1 Open 1 bottle of 12 tablets.    STEP 2 Fill the provided container with 16 ounces of water (up to the fill line). Swallow 1 tablet every 1 to 2 minutes.  You should finish the 12 tablets and the entire 16 ounces of water within 20 minutes.    STEP 3 After 1 hour of completing step 2, drink at least 32 ounces of additional water/clear liquids until 2 hours before your colonoscopy then nothing else by mouth or as directed by the scheduling nurse 7:30AM .      For information about your procedure, two (2) things to view prior to colonoscopy:  Please watch this informational video. It is important to watch this animated consent video prior to your arrival. If you haven't watched the video prior to arriving, you are required to watch it during admission which can causes delays.    Options for viewing:   Using a keyboard:  press and hold the control tab (Ctrl) and left mouse click to follow links.           Colonoscopy Instructional Video                                                                                   OR    Type link address into your web browser's address bar:  [email or web address]      Educational Booklet with pictures:      Colonoscopy Prep - Tablet     Comments: n/a         IMPORTANT  INFORMATION TO KNOW BEFORE YOUR PROCEDURE    [facility] 2nd Floor         If your procedure requires the administration of anesthesia, it is necessary for a responsible adult to drive you home. (Medical Transportation, Uber, Lyft, Taxi, etc. may ONLY be used if a responsible adult is present to accompany you home.  The responsible adult CAN'T be the  of the service).      person must be available to return to pick you up within 15 minutes of being notified of discharge.       Please bring a picture ID, insurance card, & copayment      Take Medications as directed below:      If you begin taking any blood thinning medications, injectable weight loss/diabetes medications (other than insulin) , or Adipex (Phentermine) please contact the endoscopy scheduling department listed below as soon as possible.    If you are diabetic see the attached instruction sheet regarding your medication.     If you take HEART, BLOOD PRESSURE, SEIZURE, PAIN, LUNG (including inhalers/nebulizers), ANTI-REJECTION (transplant patients), or PSYCHIATRIC medications, please take at your regular times with a sip of water or as directed by the scheduling nurse.     Important contact information:    Endoscopy [phone] Hours of operation Monday-Friday 8:00-4:30pm.    Questions about insurance or financial obligations call [phone] or [phone].    If you have questions regarding the prep or need to reschedule, please call [phone]. After hours questions requiring immediate assistance, contact [facility] nurse line at [phone] or [phone].   NOTE:     On occasion, unforeseen circumstances may cause a delay in your procedure start time. We respect your time and appreciate your patience during these circumstances.      Comments: n/a

## 2025-03-14 ENCOUNTER — ANESTHESIA EVENT (OUTPATIENT)
Dept: ENDOSCOPY | Facility: HOSPITAL | Age: 85
End: 2025-03-14
Payer: MEDICARE

## 2025-03-14 NOTE — ANESTHESIA PREPROCEDURE EVALUATION
03/14/2025  Adela Mcdonough is a 84 y.o., female.      Pre-op Assessment    I have reviewed the Patient Summary Reports.    I have reviewed the NPO Status.   I have reviewed the Medications.     Review of Systems  Anesthesia Hx:  No problems with previous Anesthesia             Denies Family Hx of Anesthesia complications.    Denies Personal Hx of Anesthesia complications.                    Social:  Smoker       Hematology/Oncology:  Hematology Normal   Oncology Normal                                   EENT/Dental:  EENT/Dental Normal           Cardiovascular:     Hypertension                                          Pulmonary:  Pulmonary Normal                       Renal/:  Renal/ Normal                 Hepatic/GI:  Hepatic/GI Normal                    Musculoskeletal:  Musculoskeletal Normal                Neurological:  Neurology Normal                                      Endocrine:  Endocrine Normal            Dermatological:  Skin Normal    Psych:   anxiety                 Physical Exam  General: Well nourished, Cooperative, Alert and Oriented    Airway:  Mallampati: II   Mouth Opening: Normal  TM Distance: Normal  Tongue: Normal  Neck ROM: Normal ROM    Chest/Lungs:  Clear to auscultation, Normal Respiratory Rate    Heart:  Rate: Normal  Rhythm: Regular Rhythm        Anesthesia Plan  Type of Anesthesia, risks & benefits discussed:    Anesthesia Type: Gen Natural Airway  Intra-op Monitoring Plan: Standard ASA Monitors  Post Op Pain Control Plan: multimodal analgesia  Induction:  IV  Informed Consent: Informed consent signed with the Patient and all parties understand the risks and agree with anesthesia plan.  All questions answered.   ASA Score: 2  Day of Surgery Review of History & Physical: H&P Update referred to the surgeon/provider.    Ready For Surgery From Anesthesia Perspective.      .

## 2025-03-25 ENCOUNTER — ANESTHESIA (OUTPATIENT)
Dept: ENDOSCOPY | Facility: HOSPITAL | Age: 85
End: 2025-03-25
Payer: MEDICARE

## 2025-03-25 ENCOUNTER — HOSPITAL ENCOUNTER (OUTPATIENT)
Facility: HOSPITAL | Age: 85
Discharge: HOME | End: 2025-03-25
Attending: SURGERY | Admitting: SURGERY
Payer: MEDICARE

## 2025-03-25 VITALS
OXYGEN SATURATION: 99 % | WEIGHT: 164 LBS | HEIGHT: 64 IN | RESPIRATION RATE: 16 BRPM | TEMPERATURE: 98 F | HEART RATE: 75 BPM | BODY MASS INDEX: 28 KG/M2 | DIASTOLIC BLOOD PRESSURE: 65 MMHG | SYSTOLIC BLOOD PRESSURE: 156 MMHG

## 2025-03-25 DIAGNOSIS — Z12.11 ENCOUNTER FOR SCREENING COLONOSCOPY: Primary | ICD-10-CM

## 2025-03-25 DIAGNOSIS — R19.5 OCCULT BLOOD POSITIVE STOOL: ICD-10-CM

## 2025-03-25 PROCEDURE — 37000009 HC ANESTHESIA EA ADD 15 MINS: Performed by: SURGERY

## 2025-03-25 PROCEDURE — 37000008 HC ANESTHESIA 1ST 15 MINUTES: Performed by: SURGERY

## 2025-03-25 PROCEDURE — 88305 TISSUE EXAM BY PATHOLOGIST: CPT | Mod: 26,,, | Performed by: STUDENT IN AN ORGANIZED HEALTH CARE EDUCATION/TRAINING PROGRAM

## 2025-03-25 PROCEDURE — 45385 COLONOSCOPY W/LESION REMOVAL: CPT | Mod: PT,KX,, | Performed by: SURGERY

## 2025-03-25 PROCEDURE — 45385 COLONOSCOPY W/LESION REMOVAL: CPT | Mod: PT,KX | Performed by: SURGERY

## 2025-03-25 PROCEDURE — 88305 TISSUE EXAM BY PATHOLOGIST: CPT | Mod: TC | Performed by: SURGERY

## 2025-03-25 PROCEDURE — 27201089 HC SNARE, DISP (ANY): Performed by: SURGERY

## 2025-03-25 PROCEDURE — 94761 N-INVAS EAR/PLS OXIMETRY MLT: CPT

## 2025-03-25 PROCEDURE — 63600175 PHARM REV CODE 636 W HCPCS: Performed by: ANESTHESIOLOGY

## 2025-03-25 PROCEDURE — 99900035 HC TECH TIME PER 15 MIN (STAT)

## 2025-03-25 PROCEDURE — 25000003 PHARM REV CODE 250: Performed by: ANESTHESIOLOGY

## 2025-03-25 RX ORDER — PHENAZOPYRIDINE HYDROCHLORIDE 95 MG/1
95 TABLET ORAL 3 TIMES DAILY PRN
COMMUNITY

## 2025-03-25 RX ORDER — PROPOFOL 10 MG/ML
VIAL (ML) INTRAVENOUS
Status: DISCONTINUED | OUTPATIENT
Start: 2025-03-25 | End: 2025-03-25

## 2025-03-25 RX ORDER — PROPOFOL 10 MG/ML
VIAL (ML) INTRAVENOUS CONTINUOUS PRN
Status: DISCONTINUED | OUTPATIENT
Start: 2025-03-25 | End: 2025-03-25

## 2025-03-25 RX ORDER — LIDOCAINE HYDROCHLORIDE 20 MG/ML
INJECTION INTRAVENOUS
Status: DISCONTINUED | OUTPATIENT
Start: 2025-03-25 | End: 2025-03-25

## 2025-03-25 RX ADMIN — PROPOFOL 100 MG: 10 INJECTION, EMULSION INTRAVENOUS at 09:03

## 2025-03-25 RX ADMIN — PROPOFOL 150 MCG/KG/MIN: 10 INJECTION, EMULSION INTRAVENOUS at 09:03

## 2025-03-25 RX ADMIN — LIDOCAINE HYDROCHLORIDE 60 MG: 20 INJECTION INTRAVENOUS at 09:03

## 2025-03-25 RX ADMIN — SODIUM CHLORIDE: 0.9 INJECTION, SOLUTION INTRAVENOUS at 09:03

## 2025-03-25 NOTE — ANESTHESIA POSTPROCEDURE EVALUATION
Anesthesia Post Evaluation    Patient: Adela Mcdonough    Procedure(s) Performed: Procedure(s) (LRB):  COLONOSCOPY, SCREENING, LOW RISK PATIENT (N/A)    Final Anesthesia Type: general      Patient location during evaluation: GI PACU  Patient participation: Yes- Able to Participate  Level of consciousness: awake and alert, oriented and awake  Post-procedure vital signs: reviewed and stable  Pain management: adequate  Airway patency: patent    PONV status at discharge: No PONV  Anesthetic complications: no      Cardiovascular status: stable  Respiratory status: unassisted and room air  Hydration status: euvolemic  Follow-up not needed.              Vitals Value Taken Time   /65 03/25/25 10:40   Temp 36.4 °C (97.5 °F) 03/25/25 10:10   Pulse 75 03/25/25 10:40   Resp 16 03/25/25 10:40   SpO2 99 % 03/25/25 10:40         Event Time   Out of Recovery 10:25:00         Pain/Naomi Score: Naomi Score: 10 (3/25/2025 10:25 AM)

## 2025-03-25 NOTE — PLAN OF CARE
"Pre op complete. Dr Herrera and Dr Gaming notified of patient reports taking "a med for my back that begins with a C but can't think of the name. Not noted on medlist. Also notified of patient reports BM now brown-yellow. Patient understands that may be difficult to complete exam due to visibility of colon walls. IVFs infusing to right hand w/o difficulty. Resting quietly. All questions answered. Emotional support offered.  "

## 2025-03-25 NOTE — [employer] PATIENT INSTRUCTIONS
Discharge Summary/Instructions after an Endoscopic Procedure  Patient Name: Adela Mcdonough  Patient MRN: [id number]  Patient YOB: 1940  Tuesday, March 25, 2025  Tatum Gaming MD  Dear patient,  As a result of recent federal legislation (The Federal Cures Act), you may   receive lab or pathology results from your procedure in your [facility]   account before your physician is able to contact you. Your physician or   their representative will relay the results to you with their   recommendations at their soonest availability.  Thank you,  RESTRICTIONS:  During your procedure today, you received medications for sedation.  These   medications may affect your judgment, balance and coordination.  Therefore,   for 24 hours, you have the following restrictions:   - DO NOT drive a car, operate machinery, make legal/financial decisions,   sign important papers or drink alcohol.    ACTIVITY:  Today: no heavy lifting, straining or running due to procedural   sedation/anesthesia.  The following day: return to full activity including work.  DIET:  Eat and drink normally unless instructed otherwise.     TREATMENT FOR COMMON SIDE EFFECTS:  - Mild abdominal pain, nausea, belching, bloating or excessive gas:  rest,   eat lightly and use a heating pad.  - Sore Throat: treat with throat lozenges and/or gargle with warm salt   water.  - Because air was used during the procedure, expelling large amounts of air   from your rectum or belching is normal.  - If a bowel prep was taken, you may not have a bowel movement for 1-3 days.    This is normal.  SYMPTOMS TO WATCH FOR AND REPORT TO YOUR PHYSICIAN:  1. Abdominal pain or bloating, other than gas cramps.  2. Chest pain.  3. Back pain.  4. Signs of infection such as: chills or fever occurring within 24 hours   after the procedure.  5. Rectal bleeding, which would show as bright red, maroon, or black stools.   (A tablespoon of blood from the rectum is not serious, especially if    hemorrhoids are present.)  6. Vomiting.  7. Weakness or dizziness.  GO DIRECTLY TO THE NEAREST EMERGENCY ROOM IF YOU HAVE ANY OF THE FOLLOWING:      Difficulty breathing              Chills and/or fever over 101 F   Persistent vomiting and/or vomiting blood   Severe abdominal pain   Severe chest pain   Black, tarry stools   Bleeding- more than one tablespoon   Any other symptom or condition that you feel may need urgent attention  Your doctor recommends these additional instructions:  If any biopsies were taken, your doctors clinic will contact you in 1 to 2   weeks with any results.  - Patient has a contact number available for emergencies.  The signs and   symptoms of potential delayed complications were discussed with the   patient.  Return to normal activities tomorrow.  Written discharge   instructions were provided to the patient.   - Resume previous diet.   - Continue present medications.   - No repeat colonoscopy due to current age (66 years or older).   - Discharge patient to home (ambulatory).  For questions, problems or results please call your physician - Tatum Gaming MD at Work:  [phone].  [facility], EMERGENCY ROOM PHONE NUMBER: [phone]  IF A COMPLICATION OR EMERGENCY SITUATION ARISES AND YOU ARE UNABLE TO REACH   YOUR PHYSICIAN - GO DIRECTLY TO THE EMERGENCY ROOM.  MD Tatum June MD  3/25/2025 10:06:23 AM  This report has been verified and signed electronically.  Dear patient,  As a result of recent federal legislation (The Federal Cures Act), you may   receive lab or pathology results from your procedure in your [facility]   account before your physician is able to contact you. Your physician or   their representative will relay the results to you with their   recommendations at their soonest availability.  Thank you,  PROVATION

## 2025-03-25 NOTE — TRANSFER OF CARE
"Anesthesia Transfer of Care Note    Patient: Adela Mcdonough    Procedure(s) Performed: Procedure(s) (LRB):  COLONOSCOPY, SCREENING, LOW RISK PATIENT (N/A)    Patient location: PACU    Anesthesia Type: general    Transport from OR: Transported from OR on 6-10 L/min O2 by face mask with adequate spontaneous ventilation    Post pain: adequate analgesia    Post assessment: no apparent anesthetic complications    Post vital signs: stable    Level of consciousness: sedated    Nausea/Vomiting: no nausea/vomiting    Complications: none    Transfer of care protocol was followed      Last vitals: Visit Vitals  BP (!) 171/73 (BP Location: Left arm, Patient Position: Lying)   Pulse 79   Temp 36.6 °C (97.9 °F) (Tympanic)   Resp 19   Ht 5' 4" (1.626 m)   Wt 74.4 kg (164 lb)   SpO2 98%   Breastfeeding No   BMI 28.15 kg/m²     " ELECTROENCEPHALOGRAM REPORT:  CLINICAL HISTORY:  Spells of confusion/agitation    RECORDING DATA:  This routine EEG was performed using the standard complement of twenty-one scalp electrodes applied according to the International 10-20 Placement System.     RESULTS:  The background contains 11-12 Hz alpha activity.  There is no appreciable asymmetry.   Drowsiness shows the expected transients.  There are no epileptogenic discharges seen during this recording.    INTERPRETATION:  This is a normal routine EEG awake and drowsy.    Carmen Denise MD  [facility]  Available directly via [facility] [employer]

## 2025-03-25 NOTE — H&P
"COLONOSCOPY HISTORY AND PHYSICAL EXAM    Procedure : Colonoscopy      INDICATIONS: positive stool test    Family Hx of CRC: none    Last Colonoscopy:  NA  Findings: NA       Past Medical History:   Diagnosis Date    Bronchitis     Fall 09/17/2017    Hypertension     Mitral valve prolapse     Tobacco use      Sedation Problems: NO  No family history on file.  Fam Hx of Sedation Problems: NO  Social History[1]    Review of Systems - Negative except   Respiratory ROS: no dyspnea  Cardiovascular ROS: no exertional chest pain  Gastrointestinal ROS: NO abdominal discomfort,  NO rectal bleeding  Musculoskeletal ROS: no muscular pain  Neurological ROS: no recent stroke    Physical Exam:  BP (!) 171/73 (BP Location: Left arm, Patient Position: Lying)   Pulse 79   Temp 97.9 °F (36.6 °C) (Tympanic)   Resp 19   Ht 5' 4" (1.626 m)   Wt 74.4 kg (164 lb)   SpO2 98%   Breastfeeding No   BMI 28.15 kg/m²   General: no distress  Head: normocephalic  Mallampati Score   Neck: supple, symmetrical, trachea midline  Lungs:  clear to auscultation bilaterally and normal respiratory effort  Heart: regular rate and rhythm and no murmur  Abdomen: soft, non-tender non-distented; bowel sounds normal; no masses,  no organomegaly  Extremities: no cyanosis or edema, or clubbing    ASA:  III    PLAN  COLONOSCOPY.    SedationPlan :MAC    The details of the procedure, the possible need for biopsy or polypectomy and the potential risks including bleeding, perforation, missed polyps were discussed in detail.    Tatum Gaming MD, FACS, FASCRS  Staff Surgeon   Colon & Rectal Surgery             [1]   Social History  Socioeconomic History    Marital status:    Tobacco Use    Smoking status: Every Day     Current packs/day: 1.00     Types: Cigarettes    Smokeless tobacco: Never   Substance and Sexual Activity    Alcohol use: Yes     Alcohol/week: 6.0 standard drinks of alcohol     Types: 6 Glasses of wine per week    Drug use: No    Sexual " activity: Never

## 2025-03-27 ENCOUNTER — RESULTS FOLLOW-UP (OUTPATIENT)
Dept: SURGERY | Facility: CLINIC | Age: 85
End: 2025-03-27

## 2025-03-27 LAB
ESTROGEN SERPL-MCNC: NORMAL PG/ML
INSULIN SERPL-ACNC: NORMAL U[IU]/ML
LAB AP CLINICAL INFORMATION: NORMAL
LAB AP GROSS DESCRIPTION: NORMAL
LAB AP PERFORMING LOCATION(S): NORMAL
LAB AP REPORT FOOTNOTES: NORMAL
T3RU NFR SERPL: NORMAL %

## 2025-06-04 DIAGNOSIS — J44.9 COPD (CHRONIC OBSTRUCTIVE PULMONARY DISEASE): Primary | ICD-10-CM

## 2025-06-05 DIAGNOSIS — M81.0 AGE-RELATED OSTEOPOROSIS WITHOUT CURRENT PATHOLOGICAL FRACTURE: ICD-10-CM

## 2025-06-05 DIAGNOSIS — Z13.820 SCREENING FOR OSTEOPOROSIS: Primary | ICD-10-CM

## 2025-06-12 ENCOUNTER — APPOINTMENT (OUTPATIENT)
Dept: RADIOLOGY | Facility: CLINIC | Age: 85
End: 2025-06-12
Attending: STUDENT IN AN ORGANIZED HEALTH CARE EDUCATION/TRAINING PROGRAM
Payer: MEDICARE

## 2025-06-12 DIAGNOSIS — M81.0 AGE-RELATED OSTEOPOROSIS WITHOUT CURRENT PATHOLOGICAL FRACTURE: ICD-10-CM

## 2025-06-12 DIAGNOSIS — Z13.820 SCREENING FOR OSTEOPOROSIS: ICD-10-CM

## 2025-06-12 PROCEDURE — 77080 DXA BONE DENSITY AXIAL: CPT | Mod: TC,PO

## 2025-06-12 PROCEDURE — 77080 DXA BONE DENSITY AXIAL: CPT | Mod: 26,,, | Performed by: INTERNAL MEDICINE

## (undated) DEVICE — BANDAGE ELASTIC 3X5 VELCRO ST

## (undated) DEVICE — UNDERGLOVES BIOGEL PI SIZE 8

## (undated) DEVICE — SPLINT PLASTER FAST SET 5X30IN

## (undated) DEVICE — BIT DRILL DISTAL RADIUS 2.5 MM

## (undated) DEVICE — UNDERGLOVE BIOGEL PI SZ 6.5 LF

## (undated) DEVICE — GLOVE BIOGEL SKINSENSE PI 7.5

## (undated) DEVICE — NDL HYPO REG 25G X 1 1/2

## (undated) DEVICE — SOL 9P NACL IRR PIC IL

## (undated) DEVICE — DRAPE C-ARM MINI DISP

## (undated) DEVICE — BLADE SURG STAINLESS STEEL #15

## (undated) DEVICE — PAD CAST SPECIALIST STRL 4

## (undated) DEVICE — PACK UPPER EXTREMITY BAPTIST

## (undated) DEVICE — GLOVE BIOGEL SKINSENSE PI 7.0

## (undated) DEVICE — SEE MEDLINE ITEM 152529

## (undated) DEVICE — PADDING CAST 4IN SPECIALIST

## (undated) DEVICE — SYR B-D DISP CONTROL 10CC100/C

## (undated) DEVICE — APPLICATOR CHLORAPREP ORN 26ML

## (undated) DEVICE — CORD FOR BIPOLAR FORCEPS 12

## (undated) DEVICE — PAD UNDERPAD 30X30

## (undated) DEVICE — TOURNIQUET SB QC SP 18X4IN

## (undated) DEVICE — BIT DRILL FAST 2 MM

## (undated) DEVICE — GLOVE BIOGEL SKINSENSE PI 6.5

## (undated) DEVICE — BUCKET PLASTER DISPOSABLE

## (undated) DEVICE — SUT MONOCRYL 4-0 PS-2

## (undated) DEVICE — GAUZE SPONGE 4X4 12PLY

## (undated) DEVICE — ALCOHOL 70% ISOP W/GREEN 16OZ

## (undated) DEVICE — DRESSING XEROFORM FOIL PK 1X8